# Patient Record
Sex: FEMALE | Race: OTHER | HISPANIC OR LATINO
[De-identification: names, ages, dates, MRNs, and addresses within clinical notes are randomized per-mention and may not be internally consistent; named-entity substitution may affect disease eponyms.]

---

## 2018-02-05 PROBLEM — Z00.00 ENCOUNTER FOR PREVENTIVE HEALTH EXAMINATION: Status: ACTIVE | Noted: 2018-02-05

## 2018-02-13 ENCOUNTER — APPOINTMENT (OUTPATIENT)
Dept: ORTHOPEDIC SURGERY | Facility: CLINIC | Age: 43
End: 2018-02-13
Payer: COMMERCIAL

## 2018-02-13 VITALS — BODY MASS INDEX: 24.55 KG/M2 | WEIGHT: 130 LBS | RESPIRATION RATE: 16 BRPM | HEIGHT: 61 IN

## 2018-02-13 DIAGNOSIS — Z82.61 FAMILY HISTORY OF ARTHRITIS: ICD-10-CM

## 2018-02-13 DIAGNOSIS — Z86.69 PERSONAL HISTORY OF OTHER DISEASES OF THE NERVOUS SYSTEM AND SENSE ORGANS: ICD-10-CM

## 2018-02-13 DIAGNOSIS — Z82.49 FAMILY HISTORY OF ISCHEMIC HEART DISEASE AND OTHER DISEASES OF THE CIRCULATORY SYSTEM: ICD-10-CM

## 2018-02-13 PROCEDURE — 99204 OFFICE O/P NEW MOD 45 MIN: CPT

## 2018-02-20 ENCOUNTER — APPOINTMENT (OUTPATIENT)
Dept: MRI IMAGING | Facility: CLINIC | Age: 43
End: 2018-02-20

## 2018-02-21 ENCOUNTER — TRANSCRIPTION ENCOUNTER (OUTPATIENT)
Age: 43
End: 2018-02-21

## 2018-02-21 ENCOUNTER — OUTPATIENT (OUTPATIENT)
Dept: OUTPATIENT SERVICES | Facility: HOSPITAL | Age: 43
LOS: 1 days | End: 2018-02-21

## 2018-02-21 ENCOUNTER — APPOINTMENT (OUTPATIENT)
Dept: MRI IMAGING | Facility: CLINIC | Age: 43
End: 2018-02-21
Payer: COMMERCIAL

## 2018-02-21 PROCEDURE — 73718 MRI LOWER EXTREMITY W/O DYE: CPT | Mod: 26,LT

## 2018-03-01 ENCOUNTER — APPOINTMENT (OUTPATIENT)
Dept: ORTHOPEDIC SURGERY | Facility: CLINIC | Age: 43
End: 2018-03-01

## 2018-03-13 ENCOUNTER — APPOINTMENT (OUTPATIENT)
Dept: ORTHOPEDIC SURGERY | Facility: CLINIC | Age: 43
End: 2018-03-13
Payer: COMMERCIAL

## 2018-03-13 VITALS — BODY MASS INDEX: 24.55 KG/M2 | WEIGHT: 130 LBS | RESPIRATION RATE: 16 BRPM | HEIGHT: 61 IN

## 2018-03-13 DIAGNOSIS — M25.851 OTHER SPECIFIED JOINT DISORDERS, RIGHT HIP: ICD-10-CM

## 2018-03-13 DIAGNOSIS — I83.90 ASYMPTOMATIC VARICOSE VEINS OF UNSPECIFIED LOWER EXTREMITY: ICD-10-CM

## 2018-03-13 DIAGNOSIS — M70.61 TROCHANTERIC BURSITIS, RIGHT HIP: ICD-10-CM

## 2018-03-13 PROCEDURE — 99214 OFFICE O/P EST MOD 30 MIN: CPT

## 2018-04-05 ENCOUNTER — APPOINTMENT (OUTPATIENT)
Age: 43
End: 2018-04-05
Payer: COMMERCIAL

## 2018-04-05 DIAGNOSIS — I87.8 OTHER SPECIFIED DISORDERS OF VEINS: ICD-10-CM

## 2018-04-05 DIAGNOSIS — R22.42 LOCALIZED SWELLING, MASS AND LUMP, LEFT LOWER LIMB: ICD-10-CM

## 2018-04-05 DIAGNOSIS — M79.605 PAIN IN LEFT LEG: ICD-10-CM

## 2018-04-05 PROCEDURE — 99204 OFFICE O/P NEW MOD 45 MIN: CPT

## 2022-12-08 ENCOUNTER — NON-APPOINTMENT (OUTPATIENT)
Age: 47
End: 2022-12-08

## 2022-12-08 ENCOUNTER — APPOINTMENT (OUTPATIENT)
Dept: BREAST CENTER | Facility: CLINIC | Age: 47
End: 2022-12-08

## 2022-12-08 VITALS
BODY MASS INDEX: 24.92 KG/M2 | WEIGHT: 132 LBS | HEART RATE: 88 BPM | HEIGHT: 61 IN | SYSTOLIC BLOOD PRESSURE: 138 MMHG | DIASTOLIC BLOOD PRESSURE: 92 MMHG

## 2022-12-08 DIAGNOSIS — Z78.9 OTHER SPECIFIED HEALTH STATUS: ICD-10-CM

## 2022-12-08 DIAGNOSIS — Z80.3 FAMILY HISTORY OF MALIGNANT NEOPLASM OF BREAST: ICD-10-CM

## 2022-12-08 DIAGNOSIS — N64.89 OTHER SPECIFIED DISORDERS OF BREAST: ICD-10-CM

## 2022-12-08 DIAGNOSIS — Z80.7 FAMILY HISTORY OF OTHER MALIGNANT NEOPLASMS OF LYMPHOID, HEMATOPOIETIC AND RELATED TISSUES: ICD-10-CM

## 2022-12-08 DIAGNOSIS — Z80.41 FAMILY HISTORY OF MALIGNANT NEOPLASM OF OVARY: ICD-10-CM

## 2022-12-08 DIAGNOSIS — Z91.89 OTHER SPECIFIED PERSONAL RISK FACTORS, NOT ELSEWHERE CLASSIFIED: ICD-10-CM

## 2022-12-08 DIAGNOSIS — D64.9 ANEMIA, UNSPECIFIED: ICD-10-CM

## 2022-12-08 PROCEDURE — 99205 OFFICE O/P NEW HI 60 MIN: CPT

## 2022-12-08 RX ORDER — IRON/IRON ASP GLY/FA/MV-MIN 38 125-25-1MG
TABLET ORAL
Refills: 0 | Status: ACTIVE | COMMUNITY

## 2022-12-08 NOTE — HISTORY OF PRESENT ILLNESS
[FreeTextEntry1] : JANE is a 47 year old female, referred by [facility] who presents for initial evaluation regarding left radial scar with calcifications found on screening mammogram as persistent distortion 1:00 3-4FN. Patient has an extensive family history of breast cancer.  Patient reports history of intermittent bilateral palpable lumps. Denies any discrete palpable abnormalities, no skin changes/dimpling, no nipple discharge bilaterally. Of note, patient is tentatively scheduled in February for bilateral implants and abdominoplasty (in [place]), previously postponed due to low hemoglobin.  \par \par BRET lifetime risk of 60.5%. \par \par 9/14/2022 ([facility]) B/L MG/US: extremely dense, right MG limited d/t technical reasons. left distortion. B/L cysts. Recc R MG and L MG for further evaluation. BIRADS 0 \par 10/19/2022 ([facility]) B/L DX MG/US: extremely dense, left 1:00 3-4cmFN, persistent distortion - recc stereo bx. BIRADS 4 \par 10/25/2022 ([facility]) Left 1:00 stereo bx (top hat clip): radial scar with calcs, dense nodular stromal fibrosis and fibroadenomatoid change with calcs, sclerosing adenosis with calcs, columnar cell change with calcs, cystic apocrine metaplasia and UDH, PASH. High risk, concordant. Surgical excision recommended. \par

## 2022-12-08 NOTE — PAST MEDICAL HISTORY
[Menarche Age ____] : age at menarche was [unfilled] [Definite ___ (Date)] : the last menstrual period was [unfilled] [Regular Cycle Intervals] : have been regular [Total Preg ___] : G[unfilled] [Live Births ___] : P[unfilled]  [Age At Live Birth ___] : Age at live birth: [unfilled] [Living ___] : Living: [unfilled] [History of Hormone Replacement Treatment] : has no history of hormone replacement treatment [FreeTextEntry2] : 1 miscarriage [FreeTextEntry6] : no [FreeTextEntry7] : yes [FreeTextEntry8] : no

## 2022-12-08 NOTE — PHYSICAL EXAM
[de-identified] : Bilateral breast/axilla/supraclavicular area no masses, discharge or adenopathy.  Small area of ecchymosis left 12:00 at site of biopsy.  Right breast 1 cup size larger than left

## 2022-12-19 ENCOUNTER — RESULT REVIEW (OUTPATIENT)
Age: 47
End: 2022-12-19

## 2022-12-19 ENCOUNTER — APPOINTMENT (OUTPATIENT)
Dept: HEMATOLOGY ONCOLOGY | Facility: CLINIC | Age: 47
End: 2022-12-19

## 2022-12-19 ENCOUNTER — OUTPATIENT (OUTPATIENT)
Dept: OUTPATIENT SERVICES | Facility: HOSPITAL | Age: 47
LOS: 1 days | End: 2022-12-19
Payer: COMMERCIAL

## 2022-12-19 DIAGNOSIS — N64.89 OTHER SPECIFIED DISORDERS OF BREAST: ICD-10-CM

## 2022-12-19 PROCEDURE — 88321 CONSLTJ&REPRT SLD PREP ELSWR: CPT

## 2022-12-20 ENCOUNTER — NON-APPOINTMENT (OUTPATIENT)
Age: 47
End: 2022-12-20

## 2022-12-22 LAB — SURGICAL PATHOLOGY STUDY: SIGNIFICANT CHANGE UP

## 2023-01-04 ENCOUNTER — APPOINTMENT (OUTPATIENT)
Dept: BREAST CENTER | Facility: CLINIC | Age: 48
End: 2023-01-04

## 2023-01-04 ENCOUNTER — NON-APPOINTMENT (OUTPATIENT)
Age: 48
End: 2023-01-04

## 2023-01-23 ENCOUNTER — NON-APPOINTMENT (OUTPATIENT)
Age: 48
End: 2023-01-23

## 2023-01-23 ENCOUNTER — APPOINTMENT (OUTPATIENT)
Dept: BREAST CENTER | Facility: CLINIC | Age: 48
End: 2023-01-23

## 2023-01-23 ENCOUNTER — APPOINTMENT (OUTPATIENT)
Dept: HEMATOLOGY ONCOLOGY | Facility: CLINIC | Age: 48
End: 2023-01-23

## 2023-01-24 ENCOUNTER — NON-APPOINTMENT (OUTPATIENT)
Age: 48
End: 2023-01-24

## 2023-01-24 NOTE — HISTORY OF PRESENT ILLNESS
[FreeTextEntry1] : JANE is a 46yo F here for pre-op evaluation. Patient has know L stereo bx proven radial scar found as distortion 1:00 3-4FN. On high risk MRI, noted to be a 0.7cm enhancing mass in skin 10:00 2FN. Radiologist Dr. Arita recommended physical examination and if negative, possible L targeted US Patient has an extensive family history of breast cancer. Patient previously referred to genetic counselor (meeting today??). Denies any discrete palpable abnormalities, no skin changes/dimpling, no nipple discharge bilaterally. Of note, patient is tentatively scheduled in February for bilateral implants and abdominoplasty (in [place]), previously postponed due to low hemoglobin.  \par \par BRET lifetime risk of 60.5%. \par \par 9/14/22: B/l MG & US ([facility])- extremely dense, right MG limited d/t technical reasons. left distortion. B/L cysts. Recc R MG and L MG for further evaluation. BIRADS 0 \par 10/19/22: L MG & US ([facility])- extremely dense, left 1:00 3-4cmFN, persistent distortion - recc stereo bx. BIRADS 4 \par 10/25/22: L 1:00 stereo bx (top hat clip): radial scar with calcs, dense nodular stromal fibrosis and fibroadenomatoid change with calcs, sclerosing adenosis with calcs, columnar cell change with calcs, cystic apocrine metaplasia and UDH, PASH. High risk, concordant. Surgical excision recommended. \par 12/19/22: MRI- Global asymmetry w/ L small (concordant w/ MG). L 1.3cm hematoma w/ tissue marker radial scar upper central 5FN (rec excisional bx). L tissue marker bx benign upper central. L dilated ducts c/w US. L 0.7cm enhancing mass in skin 10-11:00 2-3FN (rec clinical correlation). B/l multiple oval enhancing masses (rec 6m f/u MRI). BI-RADS 4 \par

## 2023-01-24 NOTE — PAST MEDICAL HISTORY
[History of Hormone Replacement Treatment] : has no history of hormone replacement treatment [FreeTextEntry2] : 1 miscarriage [FreeTextEntry6] : no [FreeTextEntry7] : yes [FreeTextEntry8] : no

## 2023-01-24 NOTE — PHYSICAL EXAM
[de-identified] : Bilateral breast/axilla/supraclavicular area no masses, discharge or adenopathy.  Small area of ecchymosis left 12:00 at site of biopsy.  Right breast 1 cup size larger than left

## 2023-02-07 ENCOUNTER — APPOINTMENT (OUTPATIENT)
Dept: PLASTIC SURGERY | Facility: CLINIC | Age: 48
End: 2023-02-07
Payer: COMMERCIAL

## 2023-02-07 PROCEDURE — 99204 OFFICE O/P NEW MOD 45 MIN: CPT

## 2023-02-07 NOTE — HISTORY OF PRESENT ILLNESS
[FreeTextEntry1] : 46 y/o female referred by Dr. Nolan with BRCA 2+ referred by Dr. Nolan presents for initial consultation to discuss for breast reconstruction options after prophylactic mastectomy. Patient is seeing Dr. Nolan this Friday to discuss surgery dates. She has a history of left radial scar. Family history of breast cancer: 3 paternal aunts and paternal first cousins. BRCA2 + paternal cousin (who also has cancer). Paternal aunt diagnosed with ovarian cancer. Patient had left breast biopsy in 12/2022: radial scar.\par \par No personal or family history of bleeding/clotting disorder. Hx of low hemoglobin.\par Prior pregnancies: G4, P3 ( 1 miscarriage ). Denies breast feeding.

## 2023-02-07 NOTE — ASSESSMENT
[FreeTextEntry1] : I reviewed with Ms. WAHL in detail the risks, benefits, and alternatives of both implant based breast reconstruction as well as autologous tissue reconstruction.\par \par Specifically, I explained to her than an implant reconstruction usually consists of two separate stages with two surgeries spaced about 4 months apart. At the time of the mastectomy, a tissue expander is placed underneath the pectoralis muscle or on top of the pectoralis muscle and is often reinforced with biologic mesh - acellular dermal matrix.  We expand the tissue expander secondarily in the office by injecting saline into the implant percutaneously until the desired size breast mound is achieved. At that point, a second stage operation is scheduled where we take her back to the operating room and remove the tissue expander and place a permanent breast implant. The permanent prosthesis can be either silicone or saline. The first stage of the reconstruction is approximately 3 hours for one breast and 4-5 hours for a bilateral procedure, with a 1-2 night hospital stay and a four-week recovery. The second stage surgery is an outpatient procedure with a two-week recovery. I reviewed with her the risks of implant reconstruction including, but not limited to, bleeding, scarring, implant infection, implant rupture, capsule contracture, implant malposition, asymmetry, contour abnormality, and need for revision surgeries. I also discussed the FDA recommendations for silicone implant rupture screening with MRI. \par \par I then reviewed with BLAIRSOM the details of autologous tissue reconstruction, specifically using a free flap from her lower abdomen.  This operation entails transplanting the skin, the fat, and blood vessels from the lower abdomen up to the chest wall where we reattach the artery and vein to blood vessels on the chest wall behind the rib to re-vascularize the tissue called a "flap" utilizing microsurgical techniques. We attempt to save all of the muscle fibers of the abdominal rectus muscle to minimize the chance of an abdominal wall weakness, bulge or hernia and only transfer the perforating blood vessels. This is called a INESSA flap. I explained to her the scar burden associated with this operation including the scars on the breasts and the lower abdomen and around the umbilicus. I explained to her that there is a risk of free flap loss and vessel thrombosis requiring a return to the operating room for emergent exploration in an attempt to salvage the flap. If we do lose a flap due to vascular compromise, we would likely place a tissue expander at the time of her return to the operating room in order to preserve the breast skin envelope and perform a delayed reconstruction. I reviewed the risks of abdominal wall morbidity including, but not limited to, abdominal wall weakness, hernia or bulge.\par \par The INESSA flap is designed to minimize the risk of abdominal wall morbidity as opposed to a TRAM flap that cuts the abdominal wall muscle, but even a INESSA flap has a small chance of abdominal wall bulge, weakness or hernia. This operation is approximately 6 hours for one side and 9 hours for a bilateral procedure with a three day hospitalization and six week recovery period. I also explained that there is a possibility of contour abnormality, asymmetry between the two breasts, and possible need for revision surgery. A surgery on the native contralateral breast to achieve symmetry in the setting of a unilateral mastectomy is usually required and often performed at the time of the implant exchange or nipple reconstruction. The nipple areolar reconstruction is performed at a later date as a separate procedure.\par \par She wishes to proceed with INESSA flap Recon, She will need preop CTA - I will coordinate with Dr. Nolan.

## 2023-02-07 NOTE — END OF VISIT
Called and spoke to Patient and informed her to call her insurance company to see if they would approve it.CPT code given to her and procedure name.   [Time Spent: ___ minutes] : I have spent [unfilled] minutes of time on the encounter.

## 2023-02-07 NOTE — CONSULT LETTER
[Consult Letter:] : I had the pleasure of evaluating your patient, [unfilled]. [Please see my note below.] : Please see my note below. [Consult Closing:] : Thank you very much for allowing me to participate in the care of this patient.  If you have any questions, please do not hesitate to contact me. [Sincerely,] : Sincerely, [FreeTextEntry2] : Tray Nolan MD\par [address] [place] [address]\par [place]. [place] [id number]\par  [FreeTextEntry3] : Oren Lerman, MD, FACS\par Cosmetic & Reconstructive Plastic Surgery\par Associate , Department of Plastic Surgery - [facility]\par Associate Professor of Surgery - [facility]\par Tel: [phone]\par Fax: [phone]\par [email or web address]\par

## 2023-02-07 NOTE — PHYSICAL EXAM
[Bra Size: _______] : Bra Size: [unfilled] [de-identified] : R>L (reports always been larger on the right)  grade II ptosis on the right, right areola larger circumference 60 mm, left 40 mm, no nipple discharge, no nipple retraction, SN- N: L 20 cm R 22 cm BD: L 13 cm R 14 cm  [de-identified] : NT, ND, no masses, well healed Pfannenstiel incisoin, adequate tissue for autologous donor site, +skin laxity, +adipose tissue

## 2023-02-28 ENCOUNTER — APPOINTMENT (OUTPATIENT)
Dept: HEMATOLOGY ONCOLOGY | Facility: CLINIC | Age: 48
End: 2023-02-28
Payer: COMMERCIAL

## 2023-02-28 PROCEDURE — 99204 OFFICE O/P NEW MOD 45 MIN: CPT | Mod: 95

## 2023-03-08 NOTE — ASSESSMENT
[FreeTextEntry1] : The visit was provided via telehealth using real-time 2-way audio visual technology. The patient, Willis Newby, was located at home at the time of the visit. The genetic counselor, An Mccracken, was located at the medical offices in [place], [place] at the time of the visit. The physician, Dr. Cam Amaya, was located in [place], [place]. Verbal consent for telehealth services was given on 23 by the patient, Willis Newby.\par \par REASON FOR CONSULT\par Willis Newby is a 47-year-old female referred by Dr. Tray Nolan for cancer genetic counseling and a discussion regarding positive genetic testing results related to hereditary cancer predisposition. Ms. Newby was seen on 2022 at which time medical and family history was ascertained and a pedigree constructed. Genetic counseling , Zoe Dillon, also participated in this session.\par \par RELEVANT MEDICAL HISTORY\par Ms. Newby was diagnosed with a left breast radial scar on 10/25/22 at age 47.  Pathology report revealed radial scar, columnar cell change, and pseudoangiomatous stromal hyperplasia. She pursued genetic testing on 23 using [facility] breast STAT panel (ordered by Dr. Nolan’ office) due to this diagnosis and strong family history of breast cancer and a pathogenic mutation was detected in the BRCA2 gene ([id number]; [id number]).\par \par OTHER MEDICAL AND SURGICAL HISTORY:\par •	Medical History: Anemia and low hemoglobin\par •	Surgical History:  x3\par \par HORMONAL HISTORY:\par Obstetrical History: \par Age at Menarche: 16\par Menopausal Status: Premenopausal \par Age at First Live Birth: 31\par Oral Contraceptive Use: Yes, on and off for a total of 2-3 years\par Hormone Replacement Therapy: No\par \par CANCER SCREENING HISTORY:  \par Breast: \par •	Mammography: 10/19/22- left stereo bx recommended\par •	Sonography: 10/19/22- left stereo bx recommended\par •	MRI: 22- wnl\par •	Biopsies: 10/25/22- radial scar, PASH\par GYN:\par •	Pelvic Examination: annual, reported history of fibroids\par •	Sonography: 10/22- reported uterine polyps, abnormal menses\par •	CA-125: No\par Colon:\par •	Colonoscopy: 2019- reportedly wnl, repeat in 5 years\par •	Upper Endoscopy: Yes, due to stomach distension, reportedly wnl\par Skin:  \par •	FBSE: Yes\par •	Lesions biopsied/removed: Reportedly benign\par \par SOCIAL HISTORY:\par •	Tobacco-product use: No\par •	Environmental exposures: No\par \par FAMILY HISTORY:\par Maternal and paternal ancestry was reported as [place] Ashkenazi [place] ancestry was denied. A detailed family history of cancer was ascertained, see below and scanned chart for pedigree. \par 	Of note, Ms. Newby reported she believes her paternal cousin with breast cancer had genetic testing and was positive for a BRCA2 pathogenic variant. \par According to Ms. Newby’ knowledge no one else in the family has had germline testing for cancer susceptibility. Consanguinity was denied. \par \par RESULTS INTERPRETATION AND ASSESSMENT\par We reviewed with Ms. Newby that she tested positive for a pathogenic mutation in the BRCA2 gene. 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 HBOC is an autosomal-dominant inherited cancer predisposition syndrome. Ms. Newby was informed that individuals with a pathogenic BRCA2 mutation have increased risks for the following:\par \par FEMALE BREAST CANCER RISK: \par Lifetime risk to develop female breast cancer is estimated to be 61-77% compared to the general population risk of 12.9%.  \par \par MALE BREAST CANCER RISK: \par Lifetime risk to develop male breast cancer is estimated to be up to 10% compared to the general population risk of <1%  \par  \par OVARIAN CANCER RISK: \par Lifetime risk to develop ovarian cancer is estimated to be 11-25% compared to the general population risk of 1.2%. Of note, the risk for ovarian cancer by age 40 is <1% and by age 50 the risk is 1-3%.\par \par PROSTATE CANCER RISK: \par Risk to develop prostate cancer by age 65 is estimated to be 5-7%. Risk by age 85 is estimated to be 41-46% compared to the general population risk of 12.1% (Lorie et al. 2020,  Urology, estimates derived from adjusted numbers to account for higher prostate cancer risk estimates for men undergoing PSA screening at regular intervals).\par \par PANCREATIC CANCER RISK: \par Lifetime risk to develop pancreatic cancer is estimated to be up to 7% compared to the general population risk of 1.6%.  \par  \par MELANOMA RISK: \par Lifetime risk to develop melanoma is estimated to be up to 5% compared to the general population risk of 2.3%.  \par \par IMPLICATIONS FOR THE PATIENT:\par Given Ms. Newby’ personal and current reported family history of cancer, and her BRCA2 positive genetic test results, the following screening guidelines and risk-reducing recommendations were discussed:\par \par BREAST: \par -Options of breast management via either high risk breast screening with annual breast MRI and mammogram or prophylactic bilateral mastectomy was discussed with the patient. \par -Ms. Newby has already met with a breast surgeon, Dr. Nolan, after testing positive to discuss her breast care options. She has decided to undergo prophylactic bilateral mastectomy with INESSA flap reconstruction scheduled for mid-April.\par \par OVARIAN:\par - We discussed ovarian cancer screening with transvaginal ultrasound and/or serum CA-125 testing has not been shown to reduce ovarian cancer mortality in women with BRCA2 mutations. Given this, we strongly recommend consideration of risk-reducing salpingo-oophorectomy (RRSO) for ovarian cancer risk-reduction to be pursued within the next year after recovery from her bilateral mastectomy and encouraged Ms. Newby to discuss this option in greater detail with a GYN oncologist. Referral was provided.\par - We also briefly discussed the option of risk-reducing salpingectomy with delayed oophorectomy. We would not, however, recommend this option as it is not recommended to defer the oophorectomy portion of the procedure beyond the normal recommended age to undergo RRSO (40-45).\par \par MELANOMA:\par - No specific screening guidelines exist, however, general melanoma risk management is appropriate, such as a full body skin examination by a physician and minimizing UV exposure. Ms. Newby was encouraged to see a dermatologist annually.\par \par PANCREATIC:\par - We discussed investigational pancreatic cancer screening may be considered for individuals with a pathogenic BRCA2 mutation and a family history of pancreatic cancer (first or second degree relative) beginning at age 50 (or 10 years younger than earliest diagnosis) in the setting of an experienced high-volume center, ideally under research conditions.\par - Given ’ has no current reported family history of pancreatic cancer, screening is not recommended at this time. \par \par OTHER:\par - In the absence of other indications, Ms. Newby should practice age-appropriate cancer screening of other organ systems as recommended for the general population.\par \par REPRODUCTIVE AND FAMILY MEMBER IMPLICATIONS:\par This mutation is inherited in an autosomal dominant pattern. We recommend the patient’s first-degree relatives, specifically her siblings and father, pursue genetic counseling and genetic testing as there is a 50% chance they also have the same mutation. Ms. Newby was made aware that if any at-risk relatives wanted to pursue genetic testing any time in the future, we would be happy to see them and coordinate testing. If they are not local, they can locate a genetic counselor using the National Society of Genetic Counselors, Find a Genetic Counselor Tool (www.nsgc.org/findageneticcounselor).\par \par The risk of passing on this mutation to a future generation is 50%. We recommend that the patient’s children pursue genetic counseling and genetic testing. We are available to see them and coordinate testing. Please note, we do not recommend genetic testing for children until they are over the age of 18.\par \par Ms. Newby was also informed that individuals with biallelic mutations in BRCA2 gene have been reported to have Fanconi-Anemia (FA). FA is an autosomal recessive condition characterized by physical abnormalities (i.e. short statures, skeletal malformations), bone marrow failure and increased risk for acute myeloid leukemia and other malignancies. For those of reproductive age, we recommend the partner of an individual who is a BRCA2 carrier also have BRCA2 genetic testing to assess the risk of having a child affected with this condition if it would inform reproductive decision making. If both individuals carry a single BRCA2 mutation, there is a 25% chance for each pregnancy to be affected with FA.\par \par RESOURCES & SUPPORT GROUPS\par Facing Our Risk of cancer Empowered (FORCE): www.FacingOurRisk.org  \par Bright Larned: www.BrightPink.org \par Sharsheret: www.sharsheret.org \par \par PLAN:\par 1. See above note for recommended management.\par 2. We encouraged sharing these results with family members as noted above. They have a risk to have inherited the same mutation. Other family may benefit from genetic testing and should contact a certified genetic counselor specializing in cancer. Due to HIPAA and [place] laws, Genetics is unable to directly contact other family at risk, but we are available should family members wish to reach out to us\par 3. Referral to gynecological oncologist were provided. \par 4. Patient informed consult note(s) will be available through their [facility] patient portal.\par 5. Genetic knowledge changes rapidly. We encouraged re-contacting Cancer Genetics in 5 years for any changes in screening recommendations or sooner if there are significant changes in personal or family history\par \par For any additional questions please call Cancer Genetics at [phone]. \par \par \par An Mccracken MS, [facility]\par Genetic Counselor, Cancer Genetics\par \par \par Attending Attestation:\par \par I have reviewed and edited the genetic counselor's note and I agree with the assessment and plan as documented. I spent approximately 45 minutes in total time of which approximately 20 minutes was face-to-face (via 2-way audiovisual telemedicine connection) with Ms. Newby reviewing her relevant personal and family history, the genetic testing results, our risk-assessment, and options for future cancer risk-reduction for the patient and her relevant family members. Over half this time was spent in counseling and coordination of care.\par \par Cam Amaya MD\par Chief, Cancer Genetics\par [facility]\par \par \par \par

## 2023-03-17 ENCOUNTER — RESULT REVIEW (OUTPATIENT)
Age: 48
End: 2023-03-17

## 2023-03-21 ENCOUNTER — OUTPATIENT (OUTPATIENT)
Dept: OUTPATIENT SERVICES | Facility: HOSPITAL | Age: 48
LOS: 1 days | End: 2023-03-21

## 2023-03-21 ENCOUNTER — APPOINTMENT (OUTPATIENT)
Dept: CT IMAGING | Facility: CLINIC | Age: 48
End: 2023-03-21
Payer: COMMERCIAL

## 2023-03-21 PROCEDURE — 74174 CTA ABD&PLVS W/CONTRAST: CPT | Mod: 26

## 2023-03-29 ENCOUNTER — APPOINTMENT (OUTPATIENT)
Dept: PLASTIC SURGERY | Facility: CLINIC | Age: 48
End: 2023-03-29
Payer: COMMERCIAL

## 2023-03-29 PROCEDURE — 99213 OFFICE O/P EST LOW 20 MIN: CPT | Mod: 95

## 2023-03-29 NOTE — HISTORY OF PRESENT ILLNESS
[Home] : at home, [unfilled] , at the time of the visit. [Other Location: e.g. Home (Enter Location, City,State)___] : at [unfilled] [Verbal consent obtained from patient] : the patient, [unfilled] [FreeTextEntry1] : Patient Name: JANE WAHL \par : Mar  6 1975 \par Date: 2023 \par Attending: Dr. Oren Lerman\par \par HPI: preop consultation for bilateral INESSA flap breast reconstruction on 23.\par \par Allergies: motrin\par Medication prescribed: aspirin 325 mg, oxycodone 5 mg, valium 5 mg\par \par ROS: complete 14 point review of systems negative except pertinent items reviewed in the HPI. Other non-contributory items reviewed in our new patient questionnaire and we have submitted it to be scanned into the medical record. \par \par Physical Exam: \par completed at time of in office evaluation \par \par Assessment/Plan:\par We have discussed pre and postop instructions, recovery limitations, restrictions and expectations, ERAS protocol, NPO status, transportation home, postop medications, COVID-19 policies, benefits and risks of the procedure. CTA reviewed. Pre-op labs reviewed. The patient would like to proceed with surgery as scheduled.\par \par LUCA WAGNER NP\par \par

## 2023-04-12 ENCOUNTER — TRANSCRIPTION ENCOUNTER (OUTPATIENT)
Age: 48
End: 2023-04-12

## 2023-04-12 VITALS
WEIGHT: 132.72 LBS | HEIGHT: 61 IN | RESPIRATION RATE: 16 BRPM | HEART RATE: 68 BPM | TEMPERATURE: 98 F | SYSTOLIC BLOOD PRESSURE: 131 MMHG | OXYGEN SATURATION: 100 % | DIASTOLIC BLOOD PRESSURE: 84 MMHG

## 2023-04-12 NOTE — PATIENT PROFILE ADULT - STATED REASON FOR ADMISSION
Bilateral simple mastectomy ( bilateral nipple skin sparing  mastectomy); Bilateral breast reconstruction with jesus flap

## 2023-04-13 ENCOUNTER — INPATIENT (INPATIENT)
Facility: HOSPITAL | Age: 48
LOS: 2 days | Discharge: HOME | DRG: 585 | End: 2023-04-16
Attending: PLASTIC SURGERY | Admitting: PLASTIC SURGERY
Payer: COMMERCIAL

## 2023-04-13 ENCOUNTER — TRANSCRIPTION ENCOUNTER (OUTPATIENT)
Age: 48
End: 2023-04-13

## 2023-04-13 ENCOUNTER — RESULT REVIEW (OUTPATIENT)
Age: 48
End: 2023-04-13

## 2023-04-13 LAB
GRAM STN FLD: SIGNIFICANT CHANGE UP
SPECIMEN SOURCE: SIGNIFICANT CHANGE UP

## 2023-04-13 PROCEDURE — 15777 ACELLULAR DERM MATRIX IMPLT: CPT | Mod: RT

## 2023-04-13 PROCEDURE — 21600 PARTIAL REMOVAL OF RIB: CPT | Mod: 80,LT,59

## 2023-04-13 PROCEDURE — 15777 ACELLULAR DERM MATRIX IMPLT: CPT | Mod: LT

## 2023-04-13 PROCEDURE — S2068: CPT | Mod: LT

## 2023-04-13 PROCEDURE — 38530 BIOPSY/REMOVAL LYMPH NODES: CPT | Mod: LT

## 2023-04-13 PROCEDURE — 21600 PARTIAL REMOVAL OF RIB: CPT | Mod: LT,59

## 2023-04-13 PROCEDURE — 88307 TISSUE EXAM BY PATHOLOGIST: CPT | Mod: 26

## 2023-04-13 PROCEDURE — S2068: CPT | Mod: RT

## 2023-04-13 PROCEDURE — 88304 TISSUE EXAM BY PATHOLOGIST: CPT | Mod: 26

## 2023-04-13 PROCEDURE — 64912 NRV RPR W/NRV ALGRFT 1ST: CPT | Mod: 80,LT

## 2023-04-13 PROCEDURE — 38530 BIOPSY/REMOVAL LYMPH NODES: CPT | Mod: 80,LT

## 2023-04-13 PROCEDURE — 64912 NRV RPR W/NRV ALGRFT 1ST: CPT | Mod: 80,RT

## 2023-04-13 PROCEDURE — 88300 SURGICAL PATH GROSS: CPT | Mod: 26,59

## 2023-04-13 PROCEDURE — 88305 TISSUE EXAM BY PATHOLOGIST: CPT | Mod: 26

## 2023-04-13 DEVICE — DOPPLER PROBE DISPOSABLE: Type: IMPLANTABLE DEVICE | Site: BILATERAL | Status: FUNCTIONAL

## 2023-04-13 DEVICE — COUPLER VESSEL ANASTOMOTIC 3MM: Type: IMPLANTABLE DEVICE | Site: BILATERAL | Status: FUNCTIONAL

## 2023-04-13 DEVICE — CLIP APPLIER ETHICON LIGACLIP 11.5" MEDIUM: Type: IMPLANTABLE DEVICE | Site: BILATERAL | Status: FUNCTIONAL

## 2023-04-13 DEVICE — CARTRIDGE MICROCLIP 30: Type: IMPLANTABLE DEVICE | Site: BILATERAL | Status: FUNCTIONAL

## 2023-04-13 DEVICE — MESH PHASIX ST 6X8IN: Type: IMPLANTABLE DEVICE | Site: BILATERAL | Status: FUNCTIONAL

## 2023-04-13 DEVICE — LIGATING CLIPS SYNOVIS SUPERFINE MICROCLIP 6: Type: IMPLANTABLE DEVICE | Site: BILATERAL | Status: FUNCTIONAL

## 2023-04-13 DEVICE — COUPLER VESSEL ANASTOMOTIC 2.5MM: Type: IMPLANTABLE DEVICE | Site: BILATERAL | Status: FUNCTIONAL

## 2023-04-13 DEVICE — SURGICEL 4 X 8": Type: IMPLANTABLE DEVICE | Site: BILATERAL | Status: FUNCTIONAL

## 2023-04-13 DEVICE — CLIP APPLIER ETHICON LIGACLIP 9 3/8" SMALL: Type: IMPLANTABLE DEVICE | Site: BILATERAL | Status: FUNCTIONAL

## 2023-04-13 DEVICE — GRAFT NERVE CONNECTOR 2X10MM: Type: IMPLANTABLE DEVICE | Site: BILATERAL | Status: FUNCTIONAL

## 2023-04-13 RX ORDER — METOCLOPRAMIDE HCL 10 MG
10 TABLET ORAL EVERY 6 HOURS
Refills: 0 | Status: DISCONTINUED | OUTPATIENT
Start: 2023-04-13 | End: 2023-04-16

## 2023-04-13 RX ORDER — OXYCODONE HYDROCHLORIDE 5 MG/1
5 TABLET ORAL EVERY 4 HOURS
Refills: 0 | Status: DISCONTINUED | OUTPATIENT
Start: 2023-04-13 | End: 2023-04-16

## 2023-04-13 RX ORDER — ONDANSETRON 8 MG/1
4 TABLET, FILM COATED ORAL EVERY 6 HOURS
Refills: 0 | Status: DISCONTINUED | OUTPATIENT
Start: 2023-04-13 | End: 2023-04-16

## 2023-04-13 RX ORDER — KETOROLAC TROMETHAMINE 30 MG/ML
30 SYRINGE (ML) INJECTION EVERY 6 HOURS
Refills: 0 | Status: DISCONTINUED | OUTPATIENT
Start: 2023-04-13 | End: 2023-04-13

## 2023-04-13 RX ORDER — OXYCODONE HYDROCHLORIDE 5 MG/1
10 TABLET ORAL EVERY 4 HOURS
Refills: 0 | Status: DISCONTINUED | OUTPATIENT
Start: 2023-04-13 | End: 2023-04-16

## 2023-04-13 RX ORDER — GABAPENTIN 400 MG/1
300 CAPSULE ORAL THREE TIMES A DAY
Refills: 0 | Status: DISCONTINUED | OUTPATIENT
Start: 2023-04-13 | End: 2023-04-16

## 2023-04-13 RX ORDER — DIAZEPAM 5 MG
5 TABLET ORAL EVERY 8 HOURS
Refills: 0 | Status: DISCONTINUED | OUTPATIENT
Start: 2023-04-13 | End: 2023-04-16

## 2023-04-13 RX ORDER — APREPITANT 80 MG/1
40 CAPSULE ORAL ONCE
Refills: 0 | Status: COMPLETED | OUTPATIENT
Start: 2023-04-13 | End: 2023-04-13

## 2023-04-13 RX ORDER — ACETAMINOPHEN 500 MG
975 TABLET ORAL EVERY 8 HOURS
Refills: 0 | Status: DISCONTINUED | OUTPATIENT
Start: 2023-04-13 | End: 2023-04-16

## 2023-04-13 RX ORDER — SENNA PLUS 8.6 MG/1
2 TABLET ORAL
Refills: 0 | Status: DISCONTINUED | OUTPATIENT
Start: 2023-04-13 | End: 2023-04-16

## 2023-04-13 RX ORDER — GABAPENTIN 400 MG/1
300 CAPSULE ORAL ONCE
Refills: 0 | Status: COMPLETED | OUTPATIENT
Start: 2023-04-13 | End: 2023-04-13

## 2023-04-13 RX ORDER — SODIUM CHLORIDE 9 MG/ML
1000 INJECTION, SOLUTION INTRAVENOUS
Refills: 0 | Status: DISCONTINUED | OUTPATIENT
Start: 2023-04-13 | End: 2023-04-14

## 2023-04-13 RX ORDER — CEFAZOLIN SODIUM 1 G
2000 VIAL (EA) INJECTION EVERY 8 HOURS
Refills: 0 | Status: COMPLETED | OUTPATIENT
Start: 2023-04-13 | End: 2023-04-14

## 2023-04-13 RX ORDER — ENOXAPARIN SODIUM 100 MG/ML
40 INJECTION SUBCUTANEOUS ONCE
Refills: 0 | Status: COMPLETED | OUTPATIENT
Start: 2023-04-13 | End: 2023-04-13

## 2023-04-13 RX ORDER — ENOXAPARIN SODIUM 100 MG/ML
40 INJECTION SUBCUTANEOUS EVERY 24 HOURS
Refills: 0 | Status: DISCONTINUED | OUTPATIENT
Start: 2023-04-14 | End: 2023-04-16

## 2023-04-13 RX ORDER — ACETAMINOPHEN 500 MG
1000 TABLET ORAL ONCE
Refills: 0 | Status: COMPLETED | OUTPATIENT
Start: 2023-04-13 | End: 2023-04-13

## 2023-04-13 RX ORDER — HYDROMORPHONE HYDROCHLORIDE 2 MG/ML
0.5 INJECTION INTRAMUSCULAR; INTRAVENOUS; SUBCUTANEOUS
Refills: 0 | Status: DISCONTINUED | OUTPATIENT
Start: 2023-04-13 | End: 2023-04-16

## 2023-04-13 RX ADMIN — SODIUM CHLORIDE 125 MILLILITER(S): 9 INJECTION, SOLUTION INTRAVENOUS at 18:43

## 2023-04-13 RX ADMIN — ENOXAPARIN SODIUM 40 MILLIGRAM(S): 100 INJECTION SUBCUTANEOUS at 23:32

## 2023-04-13 RX ADMIN — Medication 975 MILLIGRAM(S): at 21:23

## 2023-04-13 RX ADMIN — Medication 1000 MILLIGRAM(S): at 07:14

## 2023-04-13 RX ADMIN — ENOXAPARIN SODIUM 40 MILLIGRAM(S): 100 INJECTION SUBCUTANEOUS at 07:15

## 2023-04-13 RX ADMIN — GABAPENTIN 300 MILLIGRAM(S): 400 CAPSULE ORAL at 07:15

## 2023-04-13 RX ADMIN — Medication 100 MILLIGRAM(S): at 21:23

## 2023-04-13 RX ADMIN — APREPITANT 40 MILLIGRAM(S): 80 CAPSULE ORAL at 07:15

## 2023-04-13 RX ADMIN — GABAPENTIN 300 MILLIGRAM(S): 400 CAPSULE ORAL at 21:23

## 2023-04-13 RX ADMIN — SODIUM CHLORIDE 125 MILLILITER(S): 9 INJECTION, SOLUTION INTRAVENOUS at 23:39

## 2023-04-13 NOTE — PRE-ANESTHESIA EVALUATION ADULT - NSANTHPMHFT_GEN_ALL_CORE
Cardiac: Denies HTN, HLD, MI/Angina/Heart Failure, Arrhythmia, Murmur/Valvular Disorder. >4 METS  Pulmonary: Denies Asthma, COPD, NATALIA  Renal: Denies kidney dysfunction  Hepatic: Denies liver dysfunction  Gastrointestinal: Denies GERD/IBD  Endocrine: Denies DM or thyroid dysfunction  Neurologic: Denies stroke/seizure disorder  Hematologic: Denies anemia, blood clotting disorder, blood thinning medication  Oncologic: BRCA 2 positive    PSH:  Section x 3, Tubal Ligation.

## 2023-04-13 NOTE — BRIEF OPERATIVE NOTE - NSICDXBRIEFPROCEDURE_GEN_ALL_CORE_FT
PROCEDURES:  Bilateral simple mastectomy 13-Apr-2023 10:44:18  Sumanth Jay  
PROCEDURES:  Reconstruction, breast, bilateral, with INESSA flap 13-Apr-2023 17:20:04  Karo Reilly

## 2023-04-13 NOTE — BRIEF OPERATIVE NOTE - SPECIMENS
Right and left int mammary nodes
Right mastectomy, Left mastectomy, Left wound culture, Right retro-areolar complex

## 2023-04-13 NOTE — BRIEF OPERATIVE NOTE - OPERATION/FINDINGS
IMF incision bilaterally. Right side performed first, then left. Flaps raised superiorly to clavicle, medially to sternum, laterally to border of latissimus dorsi, and posteriorly to pec major fascia using electrocautery and sharp dissection. Wound cultures sent from left breast for fluid collection posterior to the nipple-areolar complex. Hemostasis confirmed. Wound bed packed w/ wet laps. Rest of procedure refer to plastics brief op.
B/l cook dopplers+

## 2023-04-14 ENCOUNTER — TRANSCRIPTION ENCOUNTER (OUTPATIENT)
Age: 48
End: 2023-04-14

## 2023-04-14 ENCOUNTER — APPOINTMENT (OUTPATIENT)
Dept: BREAST CENTER | Facility: CLINIC | Age: 48
End: 2023-04-14

## 2023-04-14 LAB
ANION GAP SERPL CALC-SCNC: 7 MMOL/L — SIGNIFICANT CHANGE UP (ref 5–17)
BUN SERPL-MCNC: 11 MG/DL — SIGNIFICANT CHANGE UP (ref 7–23)
CALCIUM SERPL-MCNC: 8 MG/DL — LOW (ref 8.4–10.5)
CHLORIDE SERPL-SCNC: 106 MMOL/L — SIGNIFICANT CHANGE UP (ref 96–108)
CO2 SERPL-SCNC: 25 MMOL/L — SIGNIFICANT CHANGE UP (ref 22–31)
CREAT SERPL-MCNC: 0.79 MG/DL — SIGNIFICANT CHANGE UP (ref 0.5–1.3)
EGFR: 93 ML/MIN/1.73M2 — SIGNIFICANT CHANGE UP
GLUCOSE SERPL-MCNC: 107 MG/DL — HIGH (ref 70–99)
HCT VFR BLD CALC: 27.9 % — LOW (ref 34.5–45)
HGB BLD-MCNC: 9 G/DL — LOW (ref 11.5–15.5)
MCHC RBC-ENTMCNC: 29 PG — SIGNIFICANT CHANGE UP (ref 27–34)
MCHC RBC-ENTMCNC: 32.3 GM/DL — SIGNIFICANT CHANGE UP (ref 32–36)
MCV RBC AUTO: 90 FL — SIGNIFICANT CHANGE UP (ref 80–100)
NRBC # BLD: 0 /100 WBCS — SIGNIFICANT CHANGE UP (ref 0–0)
PLATELET # BLD AUTO: 208 K/UL — SIGNIFICANT CHANGE UP (ref 150–400)
POTASSIUM SERPL-MCNC: 3.9 MMOL/L — SIGNIFICANT CHANGE UP (ref 3.5–5.3)
POTASSIUM SERPL-SCNC: 3.9 MMOL/L — SIGNIFICANT CHANGE UP (ref 3.5–5.3)
RBC # BLD: 3.1 M/UL — LOW (ref 3.8–5.2)
RBC # FLD: 12.8 % — SIGNIFICANT CHANGE UP (ref 10.3–14.5)
SODIUM SERPL-SCNC: 138 MMOL/L — SIGNIFICANT CHANGE UP (ref 135–145)
WBC # BLD: 14.02 K/UL — HIGH (ref 3.8–10.5)
WBC # FLD AUTO: 14.02 K/UL — HIGH (ref 3.8–10.5)

## 2023-04-14 RX ORDER — SODIUM CHLORIDE 9 MG/ML
1000 INJECTION, SOLUTION INTRAVENOUS
Refills: 0 | Status: DISCONTINUED | OUTPATIENT
Start: 2023-04-14 | End: 2023-04-14

## 2023-04-14 RX ORDER — SODIUM CHLORIDE 9 MG/ML
1000 INJECTION, SOLUTION INTRAVENOUS
Refills: 0 | Status: DISCONTINUED | OUTPATIENT
Start: 2023-04-14 | End: 2023-04-15

## 2023-04-14 RX ADMIN — GABAPENTIN 300 MILLIGRAM(S): 400 CAPSULE ORAL at 13:55

## 2023-04-14 RX ADMIN — GABAPENTIN 300 MILLIGRAM(S): 400 CAPSULE ORAL at 05:42

## 2023-04-14 RX ADMIN — OXYCODONE HYDROCHLORIDE 5 MILLIGRAM(S): 5 TABLET ORAL at 09:49

## 2023-04-14 RX ADMIN — OXYCODONE HYDROCHLORIDE 5 MILLIGRAM(S): 5 TABLET ORAL at 10:49

## 2023-04-14 RX ADMIN — Medication 100 MILLIGRAM(S): at 12:29

## 2023-04-14 RX ADMIN — OXYCODONE HYDROCHLORIDE 5 MILLIGRAM(S): 5 TABLET ORAL at 19:30

## 2023-04-14 RX ADMIN — SENNA PLUS 2 TABLET(S): 8.6 TABLET ORAL at 17:47

## 2023-04-14 RX ADMIN — Medication 100 MILLIGRAM(S): at 05:42

## 2023-04-14 RX ADMIN — GABAPENTIN 300 MILLIGRAM(S): 400 CAPSULE ORAL at 22:14

## 2023-04-14 RX ADMIN — SODIUM CHLORIDE 100 MILLILITER(S): 9 INJECTION, SOLUTION INTRAVENOUS at 08:15

## 2023-04-14 RX ADMIN — Medication 975 MILLIGRAM(S): at 22:14

## 2023-04-14 RX ADMIN — Medication 975 MILLIGRAM(S): at 13:55

## 2023-04-14 RX ADMIN — OXYCODONE HYDROCHLORIDE 5 MILLIGRAM(S): 5 TABLET ORAL at 18:44

## 2023-04-14 RX ADMIN — Medication 975 MILLIGRAM(S): at 05:42

## 2023-04-14 RX ADMIN — SENNA PLUS 2 TABLET(S): 8.6 TABLET ORAL at 05:42

## 2023-04-14 NOTE — DISCHARGE NOTE PROVIDER - HOSPITAL COURSE
INESSA flap reconstruction. Patient was brought to the operating room on 4/13/2022 for a mastectomy and INESSA flap reconstruction. Patient tolerated the procedure well with no known complications.  Patient is tolerating diet, pain is well controlled, ambulating and voiding.  In accordance with the attending the patient is stable for discharge and is to follow up as an outpatient.     46 yo female BRCA 2+ presenting for prophylactic mastectomy with INESSA flap reconstruction. Patient was brought to the operating room on 4/13/2022 for a mastectomy and INESSA flap reconstruction. INESSA flap reconstruction. Patient was brought to the operating room on 4/13/2022 for a mastectomy and INESSA flap reconstruction. Patient tolerated the procedure well with no known complications.  Patient is tolerating diet, pain is well controlled, ambulating and voiding.  In accordance with the attending the patient is stable for discharge and is to follow up as an outpatient.

## 2023-04-14 NOTE — DISCHARGE NOTE PROVIDER - CARE PROVIDER_API CALL
Lerman, Oren Z (MD)  Plastic Surgery  [address] [place] [address]  Phone: [phone]  Fax: [phone]  Follow Up Time:    Lerman, Oren Z (MD)  Plastic Surgery  [address] [place] [address]  Phone: [phone]  Fax: [phone]  Follow Up Time:     Tray Nolan)  Surgery  [facility] [address] [place] [address], [address]  [place] [address]  Phone: [phone]  Fax: [phone]  Follow Up Time:

## 2023-04-14 NOTE — DISCHARGE NOTE PROVIDER - CARE PROVIDERS DIRECT ADDRESSES
[email or web address]

## 2023-04-14 NOTE — DISCHARGE NOTE PROVIDER - NSDCFUADDINST_GEN_ALL_CORE_FT
LUCY Drain Care:  *Please look at the site every day for signs of infection (increased redness or pain, swelling, odor, yellow or bloody discharge, warm to touch, fever).  *Maintain suction of the bulb.  *Note color, consistency, and amount of fluid in the drain. Call the doctor, nurse practitioner, or VNA nurse if the amount increases significantly or changes in character.  *Be sure to empty the drain frequently. Record the output, if instructed to do so.  *You may shower; wash the area gently with warm, soapy water.  *Keep the insertion site clean and dry otherwise.  *Avoid swimming, baths, hot tubs; do not submerge yourself in water.  *Make sure to keep the drain attached securely to your body to prevent pulling or dislocation.   *Please refer to the post-operative care instructions provided from Dr. Lerman’s office.    -Follow up with Plastic & Reconstructive Surgeon, Dr. Lerman in 1 week in the office.   -Follow up with Breast Surgeon, Dr. Nolan in 1-2 weeks in the office.    -Continue LUCY drain care as instructed. (Empty and record the LUCY drainage twice daily after discharge. Also, strip/milk the drain tubing each time to minimize clogging. Bring the recorded drain amounts to the office so that it can be reviewed by the physician.)     -Take Aspirin 325mg once daily for 10days.   -Take Percocet & Valium as prescribed for pain control.    -Apply Bacitracin ointment to the belly button twice daily after discharge.    -Wear abdominal binder when out of bed, walking around.     -Diet: no restrictions.     -Showers are permitted the day of discharge. The drains and the incision lines can get wet in the shower. Do not take a bath. Pin the drains to a bathrobe belt or string or a small towel draped over your neck in order that the drains do not dangle from your skin while in the shower. Keep incision sites and LUCY drain sites clean & dry after showering.     -No heavy lifting >20 pounds or strenuous exercises.     -Call Doctor’s office or return to ER if: fever (temperature >101.4F), chills, chest pain, shortness of breath, uncontrolled/severe pain, persistent nausea/vomiting, or bleeding/oozing/redness/swelling at incision sites.  	  -For routine questions, call the office ([phone]) weekdays 9:00 A.M. - 5:00 P.M.  For emergencies after business hours, call any time using this same office phone number and the answering service will put you in touch with Dr. Lerman.

## 2023-04-14 NOTE — DISCHARGE NOTE PROVIDER - NSDCCPTREATMENT_GEN_ALL_CORE_FT
PRINCIPAL PROCEDURE  Procedure: Mastectomy, with reconstruction using INESSA free flap  Findings and Treatment:

## 2023-04-14 NOTE — DISCHARGE NOTE PROVIDER - PROVIDER TOKENS
PROVIDER:[TOKEN:[[id number]]] PROVIDER:[TOKEN:[[id number]]],PROVIDER:[TOKEN:[[id number]]]

## 2023-04-14 NOTE — DISCHARGE NOTE PROVIDER - NSDCFUSCHEDAPPT_GEN_ALL_CORE_FT
Lerman, Oren Z  [facility] Physician [facility]  PLASTICSUR [address]  Scheduled Appointment: 04/18/2023

## 2023-04-15 RX ADMIN — ENOXAPARIN SODIUM 40 MILLIGRAM(S): 100 INJECTION SUBCUTANEOUS at 00:12

## 2023-04-15 RX ADMIN — GABAPENTIN 300 MILLIGRAM(S): 400 CAPSULE ORAL at 13:18

## 2023-04-15 RX ADMIN — Medication 975 MILLIGRAM(S): at 13:18

## 2023-04-15 RX ADMIN — OXYCODONE HYDROCHLORIDE 5 MILLIGRAM(S): 5 TABLET ORAL at 12:18

## 2023-04-15 RX ADMIN — Medication 975 MILLIGRAM(S): at 05:44

## 2023-04-15 RX ADMIN — SENNA PLUS 2 TABLET(S): 8.6 TABLET ORAL at 05:44

## 2023-04-15 RX ADMIN — OXYCODONE HYDROCHLORIDE 5 MILLIGRAM(S): 5 TABLET ORAL at 18:03

## 2023-04-15 RX ADMIN — OXYCODONE HYDROCHLORIDE 5 MILLIGRAM(S): 5 TABLET ORAL at 18:45

## 2023-04-15 RX ADMIN — GABAPENTIN 300 MILLIGRAM(S): 400 CAPSULE ORAL at 05:44

## 2023-04-15 RX ADMIN — GABAPENTIN 300 MILLIGRAM(S): 400 CAPSULE ORAL at 22:46

## 2023-04-15 RX ADMIN — Medication 975 MILLIGRAM(S): at 14:00

## 2023-04-15 RX ADMIN — Medication 975 MILLIGRAM(S): at 22:46

## 2023-04-15 RX ADMIN — SENNA PLUS 2 TABLET(S): 8.6 TABLET ORAL at 18:03

## 2023-04-15 RX ADMIN — SODIUM CHLORIDE 100 MILLILITER(S): 9 INJECTION, SOLUTION INTRAVENOUS at 04:00

## 2023-04-15 RX ADMIN — ENOXAPARIN SODIUM 40 MILLIGRAM(S): 100 INJECTION SUBCUTANEOUS at 22:47

## 2023-04-15 RX ADMIN — OXYCODONE HYDROCHLORIDE 5 MILLIGRAM(S): 5 TABLET ORAL at 13:00

## 2023-04-15 NOTE — PROVIDER CONTACT NOTE (CHANGE IN STATUS NOTIFICATION) - BACKGROUND
Supposedly got oob with prior to my shift and had left sided chest pain. Did flap check with Night RN. Patient complaining of 9/10 left sided chest pain

## 2023-04-16 ENCOUNTER — TRANSCRIPTION ENCOUNTER (OUTPATIENT)
Age: 48
End: 2023-04-16

## 2023-04-16 VITALS
OXYGEN SATURATION: 97 % | RESPIRATION RATE: 16 BRPM | SYSTOLIC BLOOD PRESSURE: 106 MMHG | TEMPERATURE: 98 F | HEART RATE: 88 BPM | DIASTOLIC BLOOD PRESSURE: 70 MMHG

## 2023-04-16 PROCEDURE — 87070 CULTURE OTHR SPECIMN AEROBIC: CPT

## 2023-04-16 PROCEDURE — 88307 TISSUE EXAM BY PATHOLOGIST: CPT

## 2023-04-16 PROCEDURE — C1889: CPT

## 2023-04-16 PROCEDURE — 85027 COMPLETE CBC AUTOMATED: CPT

## 2023-04-16 PROCEDURE — 87075 CULTR BACTERIA EXCEPT BLOOD: CPT

## 2023-04-16 PROCEDURE — 88305 TISSUE EXAM BY PATHOLOGIST: CPT

## 2023-04-16 PROCEDURE — 80048 BASIC METABOLIC PNL TOTAL CA: CPT

## 2023-04-16 PROCEDURE — 88300 SURGICAL PATH GROSS: CPT

## 2023-04-16 PROCEDURE — C1762: CPT

## 2023-04-16 PROCEDURE — 88304 TISSUE EXAM BY PATHOLOGIST: CPT

## 2023-04-16 PROCEDURE — C1781: CPT

## 2023-04-16 RX ADMIN — GABAPENTIN 300 MILLIGRAM(S): 400 CAPSULE ORAL at 06:16

## 2023-04-16 RX ADMIN — SENNA PLUS 2 TABLET(S): 8.6 TABLET ORAL at 06:16

## 2023-04-16 RX ADMIN — Medication 975 MILLIGRAM(S): at 06:15

## 2023-04-16 NOTE — DISCHARGE NOTE NURSING/CASE MANAGEMENT/SOCIAL WORK - PATIENT PORTAL LINK FT
You can access the FollowMyHealth Patient Portal offered by [facility] by registering at the following website: [email or web address]. By joining [employer]’s FollowMyHealth portal, you will also be able to view your health information using other applications (apps) compatible with our system.

## 2023-04-16 NOTE — PROGRESS NOTE ADULT - SUBJECTIVE AND OBJECTIVE BOX
SUBJECTIVE: Seen and evaluated in AM with chief. GALLAGHER. Resting comfortably, states that pain is controlled. Reports some incisional soreness. Denies flatus and BM, nausea, vomiting, fever, chills.       MEDICATIONS  (STANDING):  acetaminophen     Tablet .. 975 milliGRAM(s) Oral every 8 hours  ceFAZolin   IVPB 2000 milliGRAM(s) IV Intermittent every 8 hours  dextrose 5% + sodium chloride 0.45%. 1000 milliLiter(s) (100 mL/Hr) IV Continuous <Continuous>  enoxaparin Injectable 40 milliGRAM(s) SubCutaneous every 24 hours  gabapentin 300 milliGRAM(s) Oral three times a day  senna 2 Tablet(s) Oral two times a day    MEDICATIONS  (PRN):  diazepam    Tablet 5 milliGRAM(s) Oral every 8 hours PRN muscle spasms  HYDROmorphone  Injectable 0.5 milliGRAM(s) IV Push every 15 minutes PRN Severe Pain (7 - 10)  metoclopramide Injectable 10 milliGRAM(s) IV Push every 6 hours PRN Nausea and/or Vomiting  ondansetron Injectable 4 milliGRAM(s) IV Push every 6 hours PRN Nausea  oxyCODONE    IR 5 milliGRAM(s) Oral every 4 hours PRN Moderate Pain (4 - 6)  oxyCODONE    IR 10 milliGRAM(s) Oral every 4 hours PRN Severe Pain (7 - 10)      Vital Signs Last 24 Hrs  T(C): 37.2 (14 Apr 2023 09:15), Max: 37.3 (13 Apr 2023 23:40)  T(F): 99 (14 Apr 2023 09:15), Max: 99.2 (13 Apr 2023 23:40)  HR: 83 (14 Apr 2023 09:15) (66 - 83)  BP: 90/59 (14 Apr 2023 09:15) (86/54 - 103/59)  BP(mean): 70 (13 Apr 2023 20:00) (64 - 75)  RR: 18 (14 Apr 2023 09:15) (11 - 23)  SpO2: 99% (14 Apr 2023 09:15) (98% - 100%)    Parameters below as of 14 Apr 2023 09:15  Patient On (Oxygen Delivery Method): room air        Physical Exam:  General: NAD, resting comfortably in bed  Pulmonary: Nonlabored breathing, no respiratory distress  Cardiovascular: NSR  Abdominal: soft, NT/ND  Extremities: WWP, normal strength  Breast: dressing cdi, supportive bra, soft, no evidence of hematoma, jpx 4 SS   Neuro: A/O x 3, CNs II-XII grossly intact, no focal deficits, normal motor/sensation  Pulses: palpable distal pulses    I&O's Summary    13 Apr 2023 07:01  -  14 Apr 2023 07:00  --------------------------------------------------------  IN: 4750 mL / OUT: 2960 mL / NET: 1790 mL        LABS:                        9.0    14.02 )-----------( 208      ( 14 Apr 2023 06:09 )             27.9     04-14    138  |  106  |  11  ----------------------------<  107<H>  3.9   |  25  |  0.79    Ca    8.0<L>      14 Apr 2023 06:09          CAPILLARY BLOOD GLUCOSE            RADIOLOGY & ADDITIONAL STUDIES:  
Plastic Surgery Progress Note (pg [facility]: [id number], NS: [phone])    SUBJECTIVE  The patient was seen and examined this morning during rounds. No acute events overnight. C/o sharp pain in in left chest when ambulating or moving her left arm. ECG normal, provoked by movement, reproducible with palpation. Discussed with the patient it is likely post-surgical and as the flap inset heals should improved. Bra adjusted to provide better support. She stated she feels ready to go home today.    OBJECTIVE  ___________________________________________________  VITAL SIGNS / I&O's   Vital Signs Last 24 Hrs  T(C): 36.6 (16 Apr 2023 05:45), Max: 37.2 (15 Apr 2023 12:06)  T(F): 97.9 (16 Apr 2023 05:45), Max: 99 (15 Apr 2023 12:06)  HR: 83 (16 Apr 2023 05:45) (82 - 83)  BP: 107/72 (16 Apr 2023 05:45) (104/59 - 113/71)  BP(mean): --  RR: 12 (16 Apr 2023 05:45) (12 - 18)  SpO2: 97% (16 Apr 2023 05:45) (95% - 98%)    Parameters below as of 16 Apr 2023 05:45  Patient On (Oxygen Delivery Method): room air          15 Apr 2023 07:01  -  16 Apr 2023 07:00  --------------------------------------------------------  IN:    Oral Fluid: 1440 mL  Total IN: 1440 mL    OUT:    Bulb (mL): 75 mL    Bulb (mL): 50 mL    Bulb (mL): 32.5 mL    Bulb (mL): 55 mL    Voided (mL): 1280 mL  Total OUT: 1492.5 mL    Total NET: -52.5 mL        ___________________________________________________  PHYSICAL EXAM    -- CONSTITUTIONAL: NAD, lying in bed  -- NEURO: Awake, alert  -- PULM: Non-labored respirations  -- BREAST: bilateral reconstructed breasts soft, no collection, incisions c/d/i, INESSA flap skin paddles soft, warm, appropriate color, strong Cook doppler signals. Left chest tender to palpation . Drains serosanguinous.  -- ABDOMEN: soft, non-tender, non-distended, incision c/d/i no collections, drains serosanguinous    ___________________________________________________  MICRO  Recent Cultures:  Specimen Source: .Surgical Swab #1 left retroareolar collection rule out abscess, 04-13 @ 10:12; Results   No growth to date; Gram Stain:   No organisms seen  Few WBC's; Organism: --    ___________________________________________________  MEDICATIONS  (STANDING):  acetaminophen     Tablet .. 975 milliGRAM(s) Oral every 8 hours  enoxaparin Injectable 40 milliGRAM(s) SubCutaneous every 24 hours  gabapentin 300 milliGRAM(s) Oral three times a day  senna 2 Tablet(s) Oral two times a day    MEDICATIONS  (PRN):  diazepam    Tablet 5 milliGRAM(s) Oral every 8 hours PRN muscle spasms  HYDROmorphone  Injectable 0.5 milliGRAM(s) IV Push every 15 minutes PRN Severe Pain (7 - 10)  metoclopramide Injectable 10 milliGRAM(s) IV Push every 6 hours PRN Nausea and/or Vomiting  ondansetron Injectable 4 milliGRAM(s) IV Push every 6 hours PRN Nausea  oxyCODONE    IR 5 milliGRAM(s) Oral every 4 hours PRN Moderate Pain (4 - 6)  oxyCODONE    IR 10 milliGRAM(s) Oral every 4 hours PRN Severe Pain (7 - 10)  
Plastic Surgery Progress Note (pg [facility]: [id number], NS: [phone])    SUBJECTIVE  The patient was seen and examined this morning during rounds. Ambulating, voiding, tolerating diet. Prior to rounds, patient c/o left sided chest pain. ECG performed yesterday after prior episode was unremarkable, chest pain reproducible on palpation today.    OBJECTIVE  ___________________________________________________  VITAL SIGNS / I&O's   Vital Signs Last 24 Hrs  T(C): 37.2 (15 Apr 2023 08:15), Max: 37.3 (15 Apr 2023 00:43)  T(F): 99 (15 Apr 2023 08:15), Max: 99.1 (15 Apr 2023 00:43)  HR: 82 (15 Apr 2023 08:15) (70 - 92)  BP: 107/63 (15 Apr 2023 08:15) (93/58 - 107/63)  BP(mean): --  RR: 18 (15 Apr 2023 08:15) (16 - 18)  SpO2: 95% (15 Apr 2023 08:15) (95% - 100%)    Parameters below as of 15 Apr 2023 08:15  Patient On (Oxygen Delivery Method): room air          14 Apr 2023 07:01  -  15 Apr 2023 07:00  --------------------------------------------------------  IN:    dextrose 5% + sodium chloride 0.45%: 2200 mL  Total IN: 2200 mL    OUT:    Bulb (mL): 65 mL    Bulb (mL): 65 mL    Bulb (mL): 80 mL    Bulb (mL): 65 mL    Voided (mL): 2450 mL  Total OUT: 2725 mL    Total NET: -525 mL        ___________________________________________________  PHYSICAL EXAM      -- CONSTITUTIONAL: NAD, lying in bed  -- NEURO: Awake, alert  -- PULM: Non-labored respirations  -- BREAST: bilateral reconstructed breasts soft, no collection, incisions c/d/i, INESSA flap skin paddles soft, warm, appropriate color, strong Cook doppler signals. Left chest tender to palpation medially at rib harvest sites. Drains serosanguinous.  -- ABDOMEN: soft, non-tender, non-distended, incision c/d/i no collections, drains serosanguinous    ___________________________________________________  LABS                        9.0    14.02 )-----------( 208      ( 14 Apr 2023 06:09 )             27.9     14 Apr 2023 06:09    138    |  106    |  11     ----------------------------<  107    3.9     |  25     |  0.79     Ca    8.0        14 Apr 2023 06:09      ___________________________________________________  MICRO  Recent Cultures:  Specimen Source: .Surgical Swab #1 left retroareolar collection rule out abscess, 04-13 @ 10:12; Results   No growth to date; Gram Stain:   No organisms seen  Few WBC's; Organism: --    ___________________________________________________  MEDICATIONS  (STANDING):  acetaminophen     Tablet .. 975 milliGRAM(s) Oral every 8 hours  enoxaparin Injectable 40 milliGRAM(s) SubCutaneous every 24 hours  gabapentin 300 milliGRAM(s) Oral three times a day  senna 2 Tablet(s) Oral two times a day    MEDICATIONS  (PRN):  diazepam    Tablet 5 milliGRAM(s) Oral every 8 hours PRN muscle spasms  HYDROmorphone  Injectable 0.5 milliGRAM(s) IV Push every 15 minutes PRN Severe Pain (7 - 10)  metoclopramide Injectable 10 milliGRAM(s) IV Push every 6 hours PRN Nausea and/or Vomiting  ondansetron Injectable 4 milliGRAM(s) IV Push every 6 hours PRN Nausea  oxyCODONE    IR 5 milliGRAM(s) Oral every 4 hours PRN Moderate Pain (4 - 6)  oxyCODONE    IR 10 milliGRAM(s) Oral every 4 hours PRN Severe Pain (7 - 10)  
General Surgery Post op Check    SUBJECTIVE:  Patient seen and examined at bedside in PACU for post op check. Patient reports she feels well post procedure. States pain is well controlled. Denies fevers, chills, chest pain, SOB, nausea, emesis. States she does have abdominal pain at this time.     Vital Signs Last 24 Hrs  T(C): 37 (13 Apr 2023 17:15), Max: 37 (13 Apr 2023 17:15)  T(F): 98.6 (13 Apr 2023 17:15), Max: 98.6 (13 Apr 2023 17:15)  HR: 66 (13 Apr 2023 19:00) (66 - 81)  BP: 90/53 (13 Apr 2023 19:00) (88/50 - 131/84)  BP(mean): 66 (13 Apr 2023 19:00) (64 - 75)  RR: 14 (13 Apr 2023 19:00) (12 - 23)  SpO2: 99% (13 Apr 2023 19:00) (99% - 100%)    Parameters below as of 13 Apr 2023 19:00  Patient On (Oxygen Delivery Method): nasal cannula  O2 Flow (L/min): 2      I&O's Summary    13 Apr 2023 07:01  -  13 Apr 2023 19:18  --------------------------------------------------------  IN: 3250 mL / OUT: 2005 mL / NET: 1245 mL        Physical Exam  Gen: NAD, A&Ox3  Pulm: No respiratory distress, no subcostal retractions  CV: NSR  Abd: Soft, NT, lower abdominal dressing in place. Mild tenderness to palpation  Breast:  dressing c/d/i in supportive bra, +doppler signals, soft, no evidence of hematoma  Drains: LUCY x 4 noted to be SS  Extremities:  FROM, warm and well perfused, equal bilateral muscle strength        
Plastic Surgery Progress Note    SUBJECTIVE  The patient was seen and examined this morning during rounds. No acute events overnight. Pain controlled.    OBJECTIVE  ___________________________________________________  VITAL SIGNS / I&O's   Vital Signs Last 24 Hrs  T(C): 36.6 (14 Apr 2023 04:52), Max: 37.3 (13 Apr 2023 23:40)  T(F): 97.8 (14 Apr 2023 04:52), Max: 99.2 (13 Apr 2023 23:40)  HR: 68 (14 Apr 2023 04:52) (66 - 81)  BP: 94/50 (14 Apr 2023 04:52) (86/54 - 103/59)  BP(mean): 70 (13 Apr 2023 20:00) (64 - 75)  RR: 18 (14 Apr 2023 04:52) (11 - 23)  SpO2: 99% (14 Apr 2023 04:52) (98% - 100%)    Parameters below as of 14 Apr 2023 04:52  Patient On (Oxygen Delivery Method): room air          13 Apr 2023 07:01  -  14 Apr 2023 07:00  --------------------------------------------------------  IN:    Lactated Ringers: 4750 mL  Total IN: 4750 mL    OUT:    Blood Loss (mL): 300 mL    Bulb (mL): 40 mL    Bulb (mL): 55 mL    Bulb (mL): 12.5 mL    Bulb (mL): 22.5 mL    Indwelling Catheter - Urethral (mL): 2530 mL  Total OUT: 2960 mL    Total NET: 1790 mL        ___________________________________________________  PHYSICAL EXAM    -- CONSTITUTIONAL: NAD, lying in bed  -- NEURO: Awake, alert  -- PULM: Non-labored respirations  -- BREAST: bilateral reconstructed breasts soft, no collection, incisions c/d/i, INESSA flap skin paddles soft, warm, appropriate color, strong Cook doppler signals. Drains serosanguinous.  -- ABDOMEN: soft, non-tender, non-distended, incision c/d/i no collections, drains serosanguinous    ___________________________________________________  LABS                        9.0    14.02 )-----------( 208      ( 14 Apr 2023 06:09 )             27.9     14 Apr 2023 06:09    138    |  106    |  11     ----------------------------<  107    3.9     |  25     |  0.79     Ca    8.0        14 Apr 2023 06:09        CAPILLARY BLOOD GLUCOSE              ___________________________________________________  MICRO  Recent Cultures:  Specimen Source: .Surgical Swab #1 left retroareolar collection rule out abscess, 04-13 @ 10:12; Results --; Gram Stain:   No organisms seen  Few WBC's; Organism: --    ___________________________________________________  MEDICATIONS  (STANDING):  acetaminophen     Tablet .. 975 milliGRAM(s) Oral every 8 hours  ceFAZolin   IVPB 2000 milliGRAM(s) IV Intermittent every 8 hours  dextrose 5% + sodium chloride 0.9%. 1000 milliLiter(s) (100 mL/Hr) IV Continuous <Continuous>  enoxaparin Injectable 40 milliGRAM(s) SubCutaneous every 24 hours  gabapentin 300 milliGRAM(s) Oral three times a day  senna 2 Tablet(s) Oral two times a day    MEDICATIONS  (PRN):  diazepam    Tablet 5 milliGRAM(s) Oral every 8 hours PRN muscle spasms  HYDROmorphone  Injectable 0.5 milliGRAM(s) IV Push every 15 minutes PRN Severe Pain (7 - 10)  metoclopramide Injectable 10 milliGRAM(s) IV Push every 6 hours PRN Nausea and/or Vomiting  ondansetron Injectable 4 milliGRAM(s) IV Push every 6 hours PRN Nausea  oxyCODONE    IR 5 milliGRAM(s) Oral every 4 hours PRN Moderate Pain (4 - 6)  oxyCODONE    IR 10 milliGRAM(s) Oral every 4 hours PRN Severe Pain (7 - 10)  
Patient seen and examined, VSS, afebrile, NAD, tolerating reg diet, pain well controlled, flaps pink, soft, viable, good cap refil and doppler, no collections or infection LUCY drains --> serosang.

## 2023-04-16 NOTE — PROGRESS NOTE ADULT - ASSESSMENT
48F s/p bilateral mastectomies and bilateral INESSA flap reconstruction 4/13/2023. Recovering appropriately.  - q2h flap checks  - remove Dillon  - remove telemetry leads  - clear liquid diet  - LR -> D5 1/2 NS  - ambulate with assistance  - PRN and standing pain control  - VTE ppx    Huseyin Lancaster PGY6  Plastic Surgery  Pager: [phone]  
A/P: 48y Female s/p b/l mastectomy with INESSA flap    -Monitor LUCY drain output  -Analgesia and antiemetics as needed  -Monitor Doppler  -NPO with sips  -Rest of care per Plastics    
POD #3 s/p BL mastectomy and INESSA flap reocn. doing well ready for D/c home. Reviewed PO care instructions with patient. Discussed with housestaff. F/U in office this Tuesday
48F s/p bilateral mastectomies and bilateral INESSA flap reconstruction 4/13/2023. Recovering appropriately.   - q4h flap checks  - reg diet  - IVL  - ambulate with assistance  - PRN and standing pain control  - VTE ppx  - abx  - IS  - possible DC today    Huseyin Lancaster PGY6  Plastic Surgery  Pager: [phone]
48F s/p bilateral mastectomies and bilateral INESSA flap reconstruction 4/13/2023. Recovering appropriately.   - q4h flap checks  - Suspect chest pain is post-surgical, ECG to r/o cardiac   - reg diet  - IVL  - ambulate with assistance  - PRN and standing pain control  - VTE ppx  - abx  - IS    Huseyin Lancaster PGY6  Plastic Surgery  Pager: [phone]  
A/P: 48y Female s/p b/l POD1 mastectomy with INESSA flap    Recs:  Monitor LUCY drain output  Analgesia and antiemetics as needed  Monitor Doppler  Monitor return of bowel function   Rest of care per Plastics  Discussed with attending

## 2023-04-18 ENCOUNTER — APPOINTMENT (OUTPATIENT)
Dept: PLASTIC SURGERY | Facility: CLINIC | Age: 48
End: 2023-04-18
Payer: COMMERCIAL

## 2023-04-18 DIAGNOSIS — Z15.09 GENETIC SUSCEPTIBILITY TO MALIGNANT NEOPLASM OF BREAST: ICD-10-CM

## 2023-04-18 DIAGNOSIS — Z15.01 GENETIC SUSCEPTIBILITY TO MALIGNANT NEOPLASM OF BREAST: ICD-10-CM

## 2023-04-18 PROCEDURE — 99024 POSTOP FOLLOW-UP VISIT: CPT

## 2023-04-18 NOTE — PHYSICAL EXAM
[de-identified] : Incisions c/d/i, no collections or s/sx of infection, flaps warm, viable, B/L LUCY drains removed, B/L nipples warm, viable\par  [de-identified] : Incisions c/d/i, no collections or s/sx of infection, umbo warm, viable, right LUCY drain removed, left LUCY drain in place --> serosang fluid, not ready for removal.  +Bulla x 2 midline inferior to incision and right paramedian\par

## 2023-04-18 NOTE — HISTORY OF PRESENT ILLNESS
[FreeTextEntry1] : 47 y/o female presents 5 days s/p bilateral INESSA flap breast reconstruction. Denies any f/c/n/v. Patient is taking advil and tylenol for pain. Patient is taking aspirin as prescribed. Patient has c/o "bump" on the incision site on her abdomen, today it started draining. Patient has 4 LUCY drains -> serosang fluid, 3 ready for removal.

## 2023-04-18 NOTE — ASSESSMENT
[FreeTextEntry1] : Aquaphor to incisions and blister\par B/L breast LUCY drains removed\par Right abdomen LUCY drain removed, left LUCY drain in place\par Continue aspirin as prescribed\par Sports bra/binder\par PO instructions reviewed\par \par RTC in 1 week for LUCY drain removal - will f/u w/ NP

## 2023-04-19 LAB
CULTURE RESULTS: NO GROWTH — SIGNIFICANT CHANGE UP
SPECIMEN SOURCE: SIGNIFICANT CHANGE UP
SURGICAL PATHOLOGY STUDY: SIGNIFICANT CHANGE UP

## 2023-04-21 DIAGNOSIS — Z40.01 ENCOUNTER FOR PROPHYLACTIC REMOVAL OF BREAST: ICD-10-CM

## 2023-04-21 DIAGNOSIS — Z88.2 ALLERGY STATUS TO SULFONAMIDES: ICD-10-CM

## 2023-04-21 DIAGNOSIS — Z98.891 HISTORY OF UTERINE SCAR FROM PREVIOUS SURGERY: ICD-10-CM

## 2023-04-21 DIAGNOSIS — Z15.01 GENETIC SUSCEPTIBILITY TO MALIGNANT NEOPLASM OF BREAST: ICD-10-CM

## 2023-04-21 DIAGNOSIS — Z91.018 ALLERGY TO OTHER FOODS: ICD-10-CM

## 2023-04-21 DIAGNOSIS — Z88.1 ALLERGY STATUS TO OTHER ANTIBIOTIC AGENTS STATUS: ICD-10-CM

## 2023-04-21 DIAGNOSIS — Z98.51 TUBAL LIGATION STATUS: ICD-10-CM

## 2023-04-25 ENCOUNTER — APPOINTMENT (OUTPATIENT)
Dept: PLASTIC SURGERY | Facility: CLINIC | Age: 48
End: 2023-04-25
Payer: COMMERCIAL

## 2023-04-25 PROBLEM — Z15.01 GENETIC SUSCEPTIBILITY TO MALIGNANT NEOPLASM OF BREAST: Chronic | Status: ACTIVE | Noted: 2023-04-12

## 2023-04-25 PROCEDURE — 99024 POSTOP FOLLOW-UP VISIT: CPT

## 2023-04-25 NOTE — PHYSICAL EXAM
[de-identified] : Incisions c/d/i, no collections or s/sx of infection, flaps warm, viable, B/L nipples warm, viable\par  [de-identified] : Incisions healng well, no collections or s/sx of infection, umbo warm, viable, right LUCY drain removed, left LUCY drain in place removed.  +Bulla x 2 midline inferior to incision and right paramedian\par

## 2023-04-25 NOTE — HISTORY OF PRESENT ILLNESS
[FreeTextEntry1] : 47 y/o female presents 12 days s/p bilateral INESSA flap breast reconstruction on 04/13/23. Denies any f/c/n/v. Patient is taking advil and tylenol for pain. Patient is taking aspirin as prescribed. Patient has another bump on her abdomen, does know if its draining. Patient states the tape that was placed on her drain site irritated her skin. Patient has 1 LUCY drain -> serosang fluid, ready for removal.

## 2023-04-25 NOTE — ASSESSMENT
[FreeTextEntry1] : Aquaphor to incisions and blister\par Left abdomen LUCY drain removed \par D/C aspirin Thursday\par Sports bra/binder\par PO instructions reviewed\par \par RTC in 2 weeks with NP

## 2023-05-11 ENCOUNTER — APPOINTMENT (OUTPATIENT)
Dept: PLASTIC SURGERY | Facility: CLINIC | Age: 48
End: 2023-05-11
Payer: COMMERCIAL

## 2023-05-11 DIAGNOSIS — S21.009A UNSPECIFIED OPEN WOUND OF UNSPECIFIED BREAST, INITIAL ENCOUNTER: ICD-10-CM

## 2023-05-11 PROCEDURE — 99024 POSTOP FOLLOW-UP VISIT: CPT

## 2023-05-11 RX ORDER — DIAZEPAM 5 MG/1
5 TABLET ORAL
Qty: 15 | Refills: 0 | Status: DISCONTINUED | COMMUNITY
Start: 2023-03-29 | End: 2023-05-11

## 2023-05-11 RX ORDER — ASPIRIN 325 MG/1
325 TABLET, FILM COATED ORAL
Qty: 10 | Refills: 0 | Status: DISCONTINUED | COMMUNITY
Start: 2023-03-29 | End: 2023-05-11

## 2023-05-11 RX ORDER — OXYCODONE 5 MG/1
5 TABLET ORAL
Qty: 12 | Refills: 0 | Status: DISCONTINUED | COMMUNITY
Start: 2023-03-29 | End: 2023-05-11

## 2023-05-11 NOTE — PHYSICAL EXAM
[de-identified] : Incisions healing well - left breast w/ 4 cm x 3 cm medial IMF incision wound, necrotic tissue debrided no collections or s/sx of infection, flaps warm, viable, B/L nipples warm, viable. \par  [de-identified] : Incisions well healed, no collections or s/sx of infection, umbo warm, viable.  \par

## 2023-05-11 NOTE — HISTORY OF PRESENT ILLNESS
[FreeTextEntry1] : 49 y/o female presents 4 weeks s/p bilateral INESSA flap breast reconstruction on 04/13/23. Denies any f/c/n/v. Patient is taking tylenol for pain prn. She has c/o discoloration underneath her left breast and limited movement of her left arm.

## 2023-05-11 NOTE — ASSESSMENT
[FreeTextEntry1] : Rx for collagenase given for left breast wound\par Aquaphor to incisions\par Sports bra/binder\par Rx for PT given \par PO instructions reviewed\par \par RTC s/p 3 months w/ Dr. Lerman

## 2023-06-25 ENCOUNTER — TRANSCRIPTION ENCOUNTER (OUTPATIENT)
Age: 48
End: 2023-06-25

## 2023-08-01 ENCOUNTER — APPOINTMENT (OUTPATIENT)
Dept: PLASTIC SURGERY | Facility: CLINIC | Age: 48
End: 2023-08-01
Payer: COMMERCIAL

## 2023-08-01 PROCEDURE — 99213 OFFICE O/P EST LOW 20 MIN: CPT

## 2023-08-01 RX ORDER — COLLAGENASE SANTYL 250 [ARB'U]/G
250 OINTMENT TOPICAL DAILY
Qty: 1 | Refills: 2 | Status: DISCONTINUED | COMMUNITY
Start: 2023-05-11 | End: 2023-08-01

## 2023-08-01 NOTE — REASON FOR VISIT
[Post Op: _________] : a [unfilled] post op visit [FreeTextEntry1] : Dr. Nolan [Follow-Up: _____] : a [unfilled] follow-up visit

## 2023-08-01 NOTE — SURGICAL HISTORY
[de-identified] : 04/13/23: bilateral prophylactic mastectomy and INESSA flap breast reconstruction sav/ An

## 2023-08-01 NOTE — ASSESSMENT
[FreeTextEntry1] : well healed, moisturize breasts Second stage revision: 1) dog ear excision  2) lipo left breast UIQ  3) lipo right lateral breast 4) fat graft right UIQ 5) fat graft left central and lateral breast 6) excision of skin island b/l  7) plication right IMF 8) fat harvest from flanks/ hips b/l  9) excision small abdominal dog ears  will schedule second stage revision in near future

## 2023-08-01 NOTE — HISTORY OF PRESENT ILLNESS
[FreeTextEntry1] : 47 y/o female s/p bilateral INESSA flap breast reconstruction on 04/13/23. Denies any f/c/n/v. Patient is not taking pain medications.  Her range of motion in left arm is better. Patient is doing PT twice a week. Presents today to discuss revision / second stage reconstruction

## 2023-08-01 NOTE — PHYSICAL EXAM
[de-identified] : well healed, skin island IMF b/l , excess fullness in left breast UIQ, left breast overall smaller.  [de-identified] : Well healed, good contour no hernia or bulges + small dog ears BL

## 2023-09-13 ENCOUNTER — APPOINTMENT (OUTPATIENT)
Dept: PLASTIC SURGERY | Facility: CLINIC | Age: 48
End: 2023-09-13
Payer: COMMERCIAL

## 2023-09-13 PROCEDURE — ZZZZZ: CPT

## 2023-09-21 ENCOUNTER — TRANSCRIPTION ENCOUNTER (OUTPATIENT)
Age: 48
End: 2023-09-21

## 2023-09-21 NOTE — ASU PATIENT PROFILE, ADULT - NSICDXPASTSURGICALHX_GEN_ALL_CORE_FT
PAST SURGICAL HISTORY:   delivery delivered x 3;  Tubal ligation     PAST SURGICAL HISTORY:   delivery delivered x 3;  Tubal ligation    H/O bilateral mastectomy + INESSA flap

## 2023-09-21 NOTE — ASU PATIENT PROFILE, ADULT - NS PREOP UNDERSTANDS INFO
spoke to patient to be NPO/No solid food sfater 2200 pm tonight, allow to drink water till  12Mn, dress comfortable, leave all valuable at home, bring ID photo and  insurance cards,  escort arranged, address  and telephone given to patient/yes

## 2023-09-22 ENCOUNTER — OUTPATIENT (OUTPATIENT)
Dept: OUTPATIENT SERVICES | Facility: HOSPITAL | Age: 48
LOS: 1 days | Discharge: HOME | End: 2023-09-22
Payer: COMMERCIAL

## 2023-09-22 ENCOUNTER — TRANSCRIPTION ENCOUNTER (OUTPATIENT)
Age: 48
End: 2023-09-22

## 2023-09-22 ENCOUNTER — APPOINTMENT (OUTPATIENT)
Dept: PLASTIC SURGERY | Facility: AMBULATORY SURGERY CENTER | Age: 48
End: 2023-09-22

## 2023-09-22 VITALS
TEMPERATURE: 98 F | HEART RATE: 67 BPM | SYSTOLIC BLOOD PRESSURE: 127 MMHG | RESPIRATION RATE: 16 BRPM | DIASTOLIC BLOOD PRESSURE: 70 MMHG | OXYGEN SATURATION: 100 %

## 2023-09-22 VITALS
HEART RATE: 73 BPM | HEIGHT: 61 IN | TEMPERATURE: 99 F | DIASTOLIC BLOOD PRESSURE: 78 MMHG | WEIGHT: 132.28 LBS | RESPIRATION RATE: 17 BRPM | SYSTOLIC BLOOD PRESSURE: 123 MMHG | OXYGEN SATURATION: 100 %

## 2023-09-22 DIAGNOSIS — Z90.13 ACQUIRED ABSENCE OF BILATERAL BREASTS AND NIPPLES: Chronic | ICD-10-CM

## 2023-09-22 LAB
HBV CORE AB SER-ACNC: SIGNIFICANT CHANGE UP
HBV SURFACE AB SER-ACNC: REACTIVE
HBV SURFACE AG SER-ACNC: SIGNIFICANT CHANGE UP
HCV AB S/CO SERPL IA: 0.04 S/CO — SIGNIFICANT CHANGE UP
HCV AB SERPL-IMP: SIGNIFICANT CHANGE UP
HIV 1+2 AB+HIV1 P24 AG SERPL QL IA: SIGNIFICANT CHANGE UP

## 2023-09-22 PROCEDURE — 19380 REVJ RECONSTRUCTED BREAST: CPT | Mod: 50

## 2023-09-22 PROCEDURE — 15772 GRFG AUTOL FAT LIPO EA ADDL: CPT | Mod: 59

## 2023-09-22 PROCEDURE — 15771 GRFG AUTOL FAT LIPO 50 CC/<: CPT | Mod: 59

## 2023-09-22 PROCEDURE — 14000 TIS TRNFR TRUNK 10 SQ CM/<: CPT | Mod: 59

## 2023-09-22 RX ORDER — APREPITANT 80 MG/1
40 CAPSULE ORAL ONCE
Refills: 0 | Status: COMPLETED | OUTPATIENT
Start: 2023-09-22 | End: 2023-09-22

## 2023-09-22 RX ORDER — HYDROMORPHONE HYDROCHLORIDE 2 MG/ML
0.5 INJECTION INTRAMUSCULAR; INTRAVENOUS; SUBCUTANEOUS
Refills: 0 | Status: DISCONTINUED | OUTPATIENT
Start: 2023-09-22 | End: 2023-09-22

## 2023-09-22 RX ORDER — FENTANYL CITRATE 50 UG/ML
25 INJECTION INTRAVENOUS
Refills: 0 | Status: DISCONTINUED | OUTPATIENT
Start: 2023-09-22 | End: 2023-09-22

## 2023-09-22 RX ORDER — BENZOCAINE AND MENTHOL 5; 1 G/100ML; G/100ML
1 LIQUID ORAL ONCE
Refills: 0 | Status: DISCONTINUED | OUTPATIENT
Start: 2023-09-22 | End: 2023-09-22

## 2023-09-22 RX ORDER — ACETAMINOPHEN 500 MG
1000 TABLET ORAL ONCE
Refills: 0 | Status: COMPLETED | OUTPATIENT
Start: 2023-09-22 | End: 2023-09-22

## 2023-09-22 RX ORDER — SODIUM CHLORIDE 9 MG/ML
500 INJECTION, SOLUTION INTRAVENOUS
Refills: 0 | Status: DISCONTINUED | OUTPATIENT
Start: 2023-09-22 | End: 2023-09-22

## 2023-09-22 RX ORDER — ONDANSETRON 8 MG/1
4 TABLET, FILM COATED ORAL ONCE
Refills: 0 | Status: DISCONTINUED | OUTPATIENT
Start: 2023-09-22 | End: 2023-09-22

## 2023-09-22 RX ADMIN — Medication 1000 MILLIGRAM(S): at 08:30

## 2023-09-22 RX ADMIN — HYDROMORPHONE HYDROCHLORIDE 0.5 MILLIGRAM(S): 2 INJECTION INTRAMUSCULAR; INTRAVENOUS; SUBCUTANEOUS at 13:20

## 2023-09-22 RX ADMIN — APREPITANT 40 MILLIGRAM(S): 80 CAPSULE ORAL at 08:30

## 2023-09-22 RX ADMIN — HYDROMORPHONE HYDROCHLORIDE 0.5 MILLIGRAM(S): 2 INJECTION INTRAMUSCULAR; INTRAVENOUS; SUBCUTANEOUS at 12:56

## 2023-09-22 NOTE — ASU DISCHARGE PLAN (ADULT/PEDIATRIC) - NS MD DC FALL RISK RISK
For information on Fall & Injury Prevention, visit: [email or web address] OR  [email or web address] OR  https://www.cdc.gov/steadi/patient.html

## 2023-09-23 LAB
HCV RNA SPEC NAA+PROBE-LOG IU: SIGNIFICANT CHANGE UP
HCV RNA SPEC NAA+PROBE-LOG IU: SIGNIFICANT CHANGE UP LOGIU/ML

## 2023-09-23 RX ORDER — OXYCODONE 5 MG/1
5 TABLET ORAL
Qty: 12 | Refills: 0 | Status: DISCONTINUED | COMMUNITY
Start: 2023-09-13 | End: 2023-09-23

## 2023-09-25 LAB
HIV-1 VIRAL LOAD RESULT: SIGNIFICANT CHANGE UP
HIV1 RNA # SERPL NAA+PROBE: SIGNIFICANT CHANGE UP COPIES/ML
HIV1 RNA SER-IMP: SIGNIFICANT CHANGE UP
HIV1 RNA SERPL NAA+PROBE-ACNC: SIGNIFICANT CHANGE UP
HIV1 RNA SERPL NAA+PROBE-LOG#: SIGNIFICANT CHANGE UP LG COP/ML

## 2023-09-26 ENCOUNTER — APPOINTMENT (OUTPATIENT)
Dept: PLASTIC SURGERY | Facility: CLINIC | Age: 48
End: 2023-09-26
Payer: COMMERCIAL

## 2023-09-26 VITALS — BODY MASS INDEX: 24.73 KG/M2 | OXYGEN SATURATION: 99 % | WEIGHT: 131 LBS | HEART RATE: 74 BPM | HEIGHT: 61 IN

## 2023-09-26 DIAGNOSIS — N65.0 DEFORMITY OF RECONSTRUCTED BREAST: ICD-10-CM

## 2023-09-26 PROCEDURE — 99024 POSTOP FOLLOW-UP VISIT: CPT

## 2023-09-26 RX ORDER — OXYCODONE 5 MG/1
5 TABLET ORAL
Qty: 12 | Refills: 0 | Status: DISCONTINUED | COMMUNITY
Start: 2023-09-23 | End: 2023-09-26

## 2023-11-14 ENCOUNTER — APPOINTMENT (OUTPATIENT)
Dept: PLASTIC SURGERY | Facility: CLINIC | Age: 48
End: 2023-11-14

## 2024-01-02 ENCOUNTER — APPOINTMENT (OUTPATIENT)
Dept: PLASTIC SURGERY | Facility: CLINIC | Age: 49
End: 2024-01-02
Payer: COMMERCIAL

## 2024-01-02 DIAGNOSIS — Z42.1 ENCOUNTER FOR BREAST RECONSTRUCTION FOLLOWING MASTECTOMY: ICD-10-CM

## 2024-01-02 PROCEDURE — 99213 OFFICE O/P EST LOW 20 MIN: CPT

## 2024-01-02 NOTE — ASSESSMENT
[FreeTextEntry1] : Well healed Discussed possible revision with vertical mastopexy, discussed scar burden.  She currently has an IMF incision, she would need a vertical and periareolar scar.  She does not want additional surgery at this time, will think about it in the future. Ok to tattoo in 6 months  RTC in 1 year

## 2024-01-02 NOTE — HISTORY OF PRESENT ILLNESS
[FreeTextEntry1] : 47 y/o female s/p bilateral breast reconstruction revision with fat grafting from hips/flanks on 09/22/2023. Denies any f/c/n/v. Patient is not taking any pain medications. Has no concerns.

## 2024-01-02 NOTE — SURGICAL HISTORY
[de-identified] : 04/13/23: bilateral prophylactic mastectomy and INESSA flap breast reconstruction sav/ An [de-identified] : 09/22/2023: bilateral breast revision reconstruction with fat grafting from hips/flanks, dog ear excision

## 2024-01-02 NOTE — PHYSICAL EXAM
[de-identified] : well healed, breast mounds soft, residual assymery with right breast lack of upper pole fullness, left breast excess fullness upper inner, BL glandular ptosis R>L, hypopigmentation left nipple and medial IMF scars [de-identified] : well healed good contour and symmetry, no hernia or bulge.

## (undated) DEVICE — SUT STRATAFIX SPIRAL MONOCRYL PLUS 3-0 30CM PS-2 UNDYED

## (undated) DEVICE — MARKING PEN W RULER

## (undated) DEVICE — SUT MONOCRYL 3-0 18" PS-2 UNDYED

## (undated) DEVICE — SUT PROLENE 4-0 18" PS-2

## (undated) DEVICE — NDL HYPO SAFE 25G X 1.5" (ORANGE)

## (undated) DEVICE — LONE STAR ELASTIC STAY HOOK 12MM BLUNT

## (undated) DEVICE — BLADE SCALPEL SAFETY #11 WITH PLASTIC GREEN HANDLE

## (undated) DEVICE — TONGUE DEPRESSOR

## (undated) DEVICE — SPEAR SURG EYE WECK-CELL CELOS

## (undated) DEVICE — ELCTR BOVIE PENCIL HANDPIECE ROCKER SWITCH 15FT

## (undated) DEVICE — BLADE SURGICAL #15 CARBON

## (undated) DEVICE — STAPLER SKIN PROXIMATE

## (undated) DEVICE — Device

## (undated) DEVICE — DRAPE SURGICAL #1010

## (undated) DEVICE — MERCIAN VISABILITY BACKROUND GREEN

## (undated) DEVICE — SUT VICRYL 2-0 27" CT-1 UNDYED

## (undated) DEVICE — SYR LUER LOK 3CC

## (undated) DEVICE — SYR LUER LOK 5CC

## (undated) DEVICE — SLV COMPRESSION KNEE MED

## (undated) DEVICE — WARMING BLANKET FULL UNDERBODY

## (undated) DEVICE — ADAPTER LUER LOCK TO LUER LOCK

## (undated) DEVICE — PACK LIPECTOMY

## (undated) DEVICE — DRSG TEGADERM 4X4.75

## (undated) DEVICE — NDL HYPO REGULAR BEVEL 25G X 1.5" (BLUE)

## (undated) DEVICE — SOL ANTI FOG

## (undated) DEVICE — SYR ASEPTO

## (undated) DEVICE — SUT MONOCRYL 4-0 18" P-3 UNDYED

## (undated) DEVICE — CATARACT KIT

## (undated) DEVICE — SYR LUER LOK 1CC

## (undated) DEVICE — DRSG TELFA 3 X 8

## (undated) DEVICE — WARMING BLANKET LOWER ADULT

## (undated) DEVICE — DRSG GAUZE SPONGE 2X2" STERILE

## (undated) DEVICE — POSITIONER FOAM EGG CRATE ULNAR 2PCS (PINK)

## (undated) DEVICE — ABDOMINAL BINDER MED/LG 12" X 45"-62"

## (undated) DEVICE — DRAPE MAYO STAND 30"

## (undated) DEVICE — GEL AQUSNC PACKET 20GR

## (undated) DEVICE — SYS RESOLVE FAT PROCESSING 1 UNIT

## (undated) DEVICE — BIPOLAR FORCEP KIRWAN JEWELERS STR 4" X 0.4MM W 12FT CORD (GREEN)

## (undated) DEVICE — SUT STRATAFIX SPIRAL MONOCRYL PLUS 4-0 14CM PS-2 UNDYED

## (undated) DEVICE — PACK BREAST RECONSTRUCTION

## (undated) DEVICE — ELCTR BOVIE TIP BLADE MEGADYNE E-Z CLEAN 4" EXTENDED

## (undated) DEVICE — SPONGE SURGICAL STRIP 1" X 6"

## (undated) DEVICE — SYR CONTROL LUER LOK 10CC

## (undated) DEVICE — DRAPE TOWEL BLUE 17" X 24"

## (undated) DEVICE — SUT PLAIN GUT FAST ABSORBING 5-0 PC-1

## (undated) DEVICE — SUT NYLON 8-0 5" DRM6

## (undated) DEVICE — DRAIN JACKSON PRATT 15FR ROUND END W TROCAR

## (undated) DEVICE — SUT NYLON 9-0 5" HSV6

## (undated) DEVICE — ELCTR BOVIE TIP BLADE INSULATED 2.75" EDGE

## (undated) DEVICE — LAP PAD 18 X 18"

## (undated) DEVICE — FOLEY TRAY 16FR 5CC LF UMETER CLOSED

## (undated) DEVICE — GLV 7.5 PROTEXIS ORTHO (CREAM)

## (undated) DEVICE — BAG SPONGE COUNTER EZ

## (undated) DEVICE — CANISTER SPECIMEN CONVERTOR PLASTIC

## (undated) DEVICE — CLAMP MICROVASCULAR SINGLE  1-2MM

## (undated) DEVICE — CANNULA MICROAIRE FLARED MERCEDES 4MM 30CM

## (undated) DEVICE — VENODYNE/SCD SLEEVE CALF MEDIUM

## (undated) DEVICE — PREP BETADINE SPONGE STICKS

## (undated) DEVICE — SUT ETHILON 5-0 18" FS-2

## (undated) DEVICE — DRSG DERMABOND PRINEO 60CM

## (undated) DEVICE — GLV 7.5 PROTEXIS (WHITE)

## (undated) DEVICE — SUT VICRYL 2-0 27" CT-1

## (undated) DEVICE — CLAMP DBL VENOUS SM 20G/MM2

## (undated) DEVICE — SUT ETHILON 5-0 18" P-3

## (undated) DEVICE — ONETRAC LIGHTED RETRACTOR 135 X 30MM DISP

## (undated) DEVICE — SUCTION YANKAUER BULBOUS TIP W VENT

## (undated) DEVICE — DRSG MASTISOL

## (undated) DEVICE — NDL NERVE STIM 22GA X 2IN 30 DEG

## (undated) DEVICE — CANNULA ANT CHMBR 27GX22MM

## (undated) DEVICE — FRAZIER SUCTION TIP 10FR

## (undated) DEVICE — PREP CHLORAPREP HI-LITE ORANGE 26ML

## (undated) DEVICE — SYR LUER LOK 30CC

## (undated) DEVICE — DOPPLER PROBE  CABLE

## (undated) DEVICE — BLADE SURGICAL #10 CARBON

## (undated) DEVICE — SUT MONOCRYL 3-0 27" PS-2 UNDYED

## (undated) DEVICE — DRSG DERMABOND 0.7ML

## (undated) DEVICE — TUBING BLUE CAP M/F

## (undated) DEVICE — SYR LUER LOK 20CC

## (undated) DEVICE — DRAIN RESERVOIR FOR JACKSON PRATT 100CC CARDINAL

## (undated) DEVICE — TUBING MICROAIRE ASPIRATION SET 12FT

## (undated) DEVICE — ELCTR BOVIE TIP NEEDLE INSULATED 2.8" EDGE

## (undated) DEVICE — SUT NYLON 11-0 4" DRM4

## (undated) DEVICE — PACK UPPER BODY

## (undated) DEVICE — SUT NYLON 9-0 5" DRM5